# Patient Record
Sex: FEMALE | Race: WHITE | Employment: PART TIME | URBAN - METROPOLITAN AREA
[De-identification: names, ages, dates, MRNs, and addresses within clinical notes are randomized per-mention and may not be internally consistent; named-entity substitution may affect disease eponyms.]

---

## 2017-02-09 ENCOUNTER — HOSPITAL ENCOUNTER (OUTPATIENT)
Dept: LAB | Age: 67
Discharge: HOME | End: 2017-02-09
Payer: MEDICARE

## 2017-02-09 ENCOUNTER — OFFICE VISIT (OUTPATIENT)
Dept: HEMATOLOGY | Age: 67
End: 2017-02-09

## 2017-02-09 VITALS
BODY MASS INDEX: 26.49 KG/M2 | DIASTOLIC BLOOD PRESSURE: 51 MMHG | HEIGHT: 65 IN | TEMPERATURE: 99 F | WEIGHT: 159 LBS | OXYGEN SATURATION: 98 % | HEART RATE: 49 BPM | SYSTOLIC BLOOD PRESSURE: 118 MMHG | RESPIRATION RATE: 16 BRPM

## 2017-02-09 DIAGNOSIS — K74.60 CIRRHOSIS OF LIVER WITHOUT ASCITES, UNSPECIFIED HEPATIC CIRRHOSIS TYPE (HCC): ICD-10-CM

## 2017-02-09 DIAGNOSIS — K74.60 CIRRHOSIS OF LIVER WITHOUT ASCITES, UNSPECIFIED HEPATIC CIRRHOSIS TYPE (HCC): Primary | ICD-10-CM

## 2017-02-09 LAB
ALBUMIN SERPL BCP-MCNC: 3.4 G/DL (ref 3.4–5)
ALBUMIN/GLOB SERPL: 0.9 {RATIO} (ref 0.8–1.7)
ALP SERPL-CCNC: 118 U/L (ref 45–117)
ALT SERPL-CCNC: 35 U/L (ref 13–56)
ANION GAP BLD CALC-SCNC: 9 MMOL/L (ref 3–18)
AST SERPL W P-5'-P-CCNC: 32 U/L (ref 15–37)
BASOPHILS # BLD AUTO: 0 K/UL (ref 0–0.06)
BASOPHILS # BLD: 0 % (ref 0–2)
BILIRUB DIRECT SERPL-MCNC: 0.4 MG/DL (ref 0–0.2)
BILIRUB SERPL-MCNC: 1.4 MG/DL (ref 0.2–1)
BUN SERPL-MCNC: 9 MG/DL (ref 7–18)
BUN/CREAT SERPL: 11 (ref 12–20)
CALCIUM SERPL-MCNC: 9.9 MG/DL (ref 8.5–10.1)
CHLORIDE SERPL-SCNC: 105 MMOL/L (ref 100–108)
CO2 SERPL-SCNC: 30 MMOL/L (ref 21–32)
CREAT SERPL-MCNC: 0.81 MG/DL (ref 0.6–1.3)
DIFFERENTIAL METHOD BLD: ABNORMAL
EOSINOPHIL # BLD: 0.2 K/UL (ref 0–0.4)
EOSINOPHIL NFR BLD: 3 % (ref 0–5)
ERYTHROCYTE [DISTWIDTH] IN BLOOD BY AUTOMATED COUNT: 12.9 % (ref 11.6–14.5)
GLOBULIN SER CALC-MCNC: 3.6 G/DL (ref 2–4)
GLUCOSE SERPL-MCNC: 243 MG/DL (ref 74–99)
HCT VFR BLD AUTO: 40.4 % (ref 35–45)
HGB BLD-MCNC: 13.9 G/DL (ref 12–16)
INR PPP: 1.1 (ref 0.8–1.2)
LYMPHOCYTES # BLD AUTO: 40 % (ref 21–52)
LYMPHOCYTES # BLD: 1.9 K/UL (ref 0.9–3.6)
MCH RBC QN AUTO: 32.5 PG (ref 24–34)
MCHC RBC AUTO-ENTMCNC: 34.4 G/DL (ref 31–37)
MCV RBC AUTO: 94.4 FL (ref 74–97)
MONOCYTES # BLD: 0.3 K/UL (ref 0.05–1.2)
MONOCYTES NFR BLD AUTO: 7 % (ref 3–10)
NEUTS SEG # BLD: 2.4 K/UL (ref 1.8–8)
NEUTS SEG NFR BLD AUTO: 50 % (ref 40–73)
PLATELET # BLD AUTO: 87 K/UL (ref 135–420)
PMV BLD AUTO: 11.6 FL (ref 9.2–11.8)
POTASSIUM SERPL-SCNC: 4 MMOL/L (ref 3.5–5.5)
PROT SERPL-MCNC: 7 G/DL (ref 6.4–8.2)
PROTHROMBIN TIME: 14.1 SEC (ref 11.5–15.2)
RBC # BLD AUTO: 4.28 M/UL (ref 4.2–5.3)
SODIUM SERPL-SCNC: 144 MMOL/L (ref 136–145)
WBC # BLD AUTO: 4.8 K/UL (ref 4.6–13.2)

## 2017-02-09 PROCEDURE — 36415 COLL VENOUS BLD VENIPUNCTURE: CPT | Performed by: NURSE PRACTITIONER

## 2017-02-09 PROCEDURE — 82107 ALPHA-FETOPROTEIN L3: CPT | Performed by: NURSE PRACTITIONER

## 2017-02-09 PROCEDURE — 85610 PROTHROMBIN TIME: CPT | Performed by: NURSE PRACTITIONER

## 2017-02-09 PROCEDURE — 85025 COMPLETE CBC W/AUTO DIFF WBC: CPT | Performed by: NURSE PRACTITIONER

## 2017-02-09 PROCEDURE — 80076 HEPATIC FUNCTION PANEL: CPT | Performed by: NURSE PRACTITIONER

## 2017-02-09 PROCEDURE — 80048 BASIC METABOLIC PNL TOTAL CA: CPT | Performed by: NURSE PRACTITIONER

## 2017-02-09 NOTE — PROGRESS NOTES
2 [facility]    Lupis Hicks MD, DANIELE Brothers PA-C Jannelle Dunk, MD, FACP, MD Arabella Sparks NP Arelia Metz, NP        at [address], [address] [address]     [phone]     FAX: [phone]    at [facility], [address] [place] [address], [address]     [phone]     FAX: [phone]       Patient Care Team:  Radha Gonzalez DO as PCP - General (Family Practice)  Ferdinand Moreno MD (Internal Medicine)      Problem List  Date Reviewed: 2/9/2017          Codes Class Noted    Cirrhosis, cryptogenic ([facility].) ICD-10-CM: K74.69  ICD-9-CM: 571.5  7/26/2014        Diabetes mellitus ([facility].) ICD-10-CM: E11.9  ICD-9-CM: 250.00  7/21/2014        Hypothyroidism ICD-10-CM: E03.9  ICD-9-CM: 244.9  7/21/2014        S/P JAYY (total abdominal hysterectomy) ICD-10-CM: Z90.710  ICD-9-CM: V88.01  7/21/2014        S/P cataract surgery ICD-10-CM: Z98.49  ICD-9-CM: V45.61  7/21/2014        Previous back surgery ICD-10-CM: Z98.890  ICD-9-CM: V45.89  7/21/2014                Leonor Dillon returns to the [place] [address] for management of cryptogenic cirrhosis. The active problem list, all pertinent past medical history, medications, liver histology, radiologic findings and laboratory findings related to the liver disorder were reviewed with the patient. The patient has not developed ascites. Edema has resolved with low dose of diuretics. The patient has not developed hepatic encephalopathy. Imaging of the liver was performed in 09/2016 with ultrasound. Consistent with cirrhosis. No focal lesion identified. Shear wave elastography was performed with the ultrasound. This suggests cirrhosis and fatty liver. EGD in 9/2015 demonstrated normal esophagus. This will be repeated in 09/2017.      The patient underwent a liver biopsy in 2014. Dr. Andres Juarez has reviewed the biopsy slides. This demonstrates inactive cirrhosis with only minimal steatosis and no inflammation. The most recent laboratory studies indicate that the liver transaminases are normal, alkaline phosphatase is normal, tests of hepatic synthetic and metabolic function are normal, and the platelet count is depressed. The patient has no complaints which can be attributed to liver disease. The patient denies fatigue since last visit. The patient completes all daily activities without any functional limitations. The patient has not experienced fevers, chills, shortness of breath, chest pain, pain in the right side over the liver, diffuse abdominal pain, nausea, vomiting, constipation, diarrhrea, dry eyes, dry mouth, arthralgias, myalgias, yellowing of the eyes or skin, itching, dark urine, problems concentrating, swelling of the abdomen, swelling of the lower extremities, hematemesis, or hematochezia. ALLERGIES:  No Known Allergies    MEDICATIONS  Current Outpatient Prescriptions   Medication Sig    spironolactone (ALDACTONE) 50 mg tablet TAKE 1 TABLET EVERY DAY    b complex vitamins tablet Take 5,000 Tabs by mouth daily.  levothyroxine (SYNTHROID) 100 mcg tablet Take 100 mcg by mouth Daily (before breakfast). Indications: HYPOTHYROIDISM     No current facility-administered medications for this visit. REVIEW OF SYSTEMS:  Systems related to all organ systems were reviewed. All were negative except as noted above. FAMILY HISTORY:  The father  of CVA. The mother  of cirrhosis. She did not consume alcohol. There are no other family members with liver disease. SOCIAL HISTORY:  The patient is . The patient has 4 children, and 8 grandchildren. The patient has never used tobacco products. The patient has never consumed significant amounts of alcohol.     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 PHYSICAL EXAMINATION:  Visit Vitals    /51 (BP 1 Location: Left arm, BP Patient Position: Sitting)    Pulse (!) 49    Temp 99 °F (37.2 °C) (Tympanic)    Resp 16    Ht 5' 5\" (1.651 m)    Wt 159 lb (72.1 kg)    SpO2 98%    BMI 26.46 kg/m2     General: No acute distress. Eyes: Sclera anicteric. ENT: No oral lesions. Thyroid normal.  Nodes: No adenopathy. Skin: No spider angiomata. No jaundice. No palmar erythema. Respiratory: Lungs clear to auscultation. Cardiovascular: Regular heart rate. No murmurs. No JVD. Abdomen: Soft non-tender. Liver size normal to percussion/palpation. Spleen not palpable. No obvious ascites. Extremities: No edema. No muscle wasting. No gross arthritic changes. Neurologic: Alert and oriented. Cranial nerves grossly intact. No asterixsis.     LABORATORY STUDIES:    [address] Units 2/9/2017 7/26/2016 1/26/2016   WBC 4.6 - 13.2 K/uL 4.8 5.8 4.7   ANC 1.8 - 8.0 K/UL 2.4 3.3 2.7   HGB 12.0 - 16.0 g/dL 13.9 14.2 13.7    - 420 K/uL 87 (L) 107 (L) 93 (L)   INR 0.8 - 1.2   1.1 1.1 1.0   AST 15 - 37 U/L 32 39 (H) 28   ALT 13 - 56 U/L 35 40 32   Alk Phos 45 - 117 U/L 118 (H) 116 90   Bili, Total 0.2 - 1.0 MG/DL 1.4 (H) 1.6 (H) 1.1 (H)   Bili, Direct 0.0 - 0.2 MG/DL 0.4 (H) 0.4 (H) 0.3 (H)   Albumin 3.4 - 5.0 g/dL 3.4 3.5 3.4   BUN 7.0 - 18 MG/DL 9 12 8   Creat 0.6 - 1.3 MG/DL 0.81 0.84 0.79   Na 136 - 145 mmol/L 144 141 145   K 3.5 - 5.5 mmol/L 4.0 3.9 3.9   Cl 100 - 108 mmol/L 105 103 106   CO2 21 - 32 mmol/L 30 28 28   Glucose 74 - 99 mg/dL 243 (H) 150 (H) 254 (H)     Cancer Screening Latest Ref Rng & Units 2/9/2017 7/26/2016 1/26/2016   AFP, Serum 0.0 - 8.0 ng/mL 4.4 5.7 4.8   AFP-L3% 0.0 - 9.9 % Comment 6.5 8.1     SEROLOGIES:  Serologies Latest Ref Rng 7/21/2014   Hep A Ab, Total Negative Positive (A)   Hep B Surface Ag Negative Negative   Hep B Core Ab, Total Negative Negative   Ferritin 15 - 150 ng/mL 62 Iron % Saturation 15 - 55 % 29   SONIA, IFA  Negative   ASMCA 0 - 19 Units 8   M2 Ab 0.0 - 20.0 Units 11.4   Ceruloplasmin 16.0 - 45.0 mg/dL 30.3   Alpha-1 antitrypsin level 90 - 200 mg/dL 199     7/2014. Anti-HBsurface negative, anti-HCV negative. LIVER HISTOLOGY:  8/2014. Slides reviewed by MLS. Cirrhosis with minimal steatosis and no inflammation. Etiology most likely BOURNE.  09/2016. Shear wave elastography. E median is 16.77 kPa. This is highly suggestive of cirrhosis. The E Std is 4.76. This wide distrubution of data indicates fatty liver. ENDOSCOPIC PROCEDURES:  9/2014. EGD by MLS. Small esophageal varices. No banding performed. Gastitis. H.Pylori negative. 9/2015. EGD by Dr. Pamela Harper. Normal esophagus. Repeat in two years. RADIOLOGY:  6/2014. Ultrasound of liver. Echogenic consistent with cirrhosis. No liver mass lesions. No dilated bile ducts. No ascites. Recanalization of umbilical vein. 6/2014. CT scan abdomen with IV contrast.  Changes consistent with cirrhosis. No liver mass lesions. No dilated bile ducts. No ascites. 01/2015. Ultrasound of liver. Diffusely coarsened and echogenic hepatic echotexture. No focal lesion identified. 05/2015. Ultrasound of the liver. Compatible with cirrhosis. No focal hepatic lesion. 05/2015. CT of the liver. Cirrhotic appearing liver. No focal hepatic lesion. Findings in keeping with portal hypertension, no ascites. 11/2015. US of the liver. Small echogenic liver compatible with history of cirrhosis, with no new focal intrahepatic lesion. 09/2016. Ultrasound of the liver. Consistent with cirrhosis. No hepatic masses. OTHER TESTING:  Not available or performed    ASSESSMENT AND PLAN:  Cryptogenic cirrhosis. This is likely secondary to BOURNE given DM and the finding of minimal steatosis on the liver biopsy.   However, she does have positive autoimmune markers and manifests some autoimmune disorders, so this could be of resolving autoimmune etiology. The most recent laboratory studies indicate that the liver transaminases are normal, alkaline phosphatase is normal, tests of hepatic synthetic and metabolic function are normal, and the platelet count is depressed. Will perform laboratory testing to monitor liver function and degree of liver injury. This will include hepatic panel, a CBC w/ diff, a BMP, an AFP-L3%, and a PT/INR. Complications of cirrhosis were discussed in detail. We discussed thrombocytopenia, portal hypertension, varices, GI bleeding, peripheral edema, ascites, hepatic encephalopathy, and hepatocellular carcinoma. We discussed the need for follow ups on a regular basis to monitor for complications. We discussed the need for every 6 month liver imaging studies. The patient does not require liver transplant evaluation at this time and may never require this if liver function remains normal.      EGD was performed in 09/2015. This demonstrated a normal esophagus. Repeat in 09/2017. Hepatic encephalopathy has not developed to date. There is no need for treatment with lactulose and/or xifaxan at this time. The patient has developed minimal edema which is well controlled with low dose diuretics. Lower extremity edema has resolved with low dose aldactone 50 mg QD. Salt restrictions discussed. The patient was directed to continue all current medications at the current dosages. There are no contraindications for the patient to take any medications that are necessary for treatment of other medical issues. The patient was counseled regarding alcohol consumption. Thrombocytopenia secondary to cirrhosis. There is no evidence of overt bleeding. There is no indication for platelet transfusions or pharmacologic treatment to increase the platelet count. Vaccination for viral hepatitis A is not needed.   The patient has serologic evidence of prior exposure or vaccination with immunity. [address] screening has recently been performed and does not suggest [address] Repeat imaging in 03/2017. This was ordered today. [facility] 87 in 6 months.     Trista Willoughby NP   [facility] [place] of [address] [place] [address], [address] [place] [address]  [phone]

## 2017-02-09 NOTE — MR AVS SNAPSHOT
Visit Information Date & Time Provider Department Dept. Phone Encounter #  
 2/9/2017  2:30 PM Peter Farley NP [facility] [place] [facility] [id number] Follow-up Instructions Return in about 6 months (around 8/9/2017). Your Appointments 8/10/2017  3:00 PM  
Follow Up with Peter Farley NP [facility] [place] [facility] [place] ([facility]) Appt Note: Cryptogenic Cirrhosis [address]  
  
   
 [address] [place] [address] Upcoming Health Maintenance Date Due HEMOGLOBIN A1C Q6M 1950 LIPID PANEL Q1 1950 FOOT EXAM Q1 12/4/1958 MICROALBUMIN Q1 12/4/1958 EYE EXAM RETINAL OR DILATED Q1 12/4/1958 DTaP/Tdap/Td series (1 - Tdap) 12/4/1969 BREAST CANCER SCRN MAMMOGRAM 12/4/1998 FOBT Q 1 YEAR AGE 50-75 12/4/1998 ZOSTER VACCINE AGE 60> 12/4/2008 GLAUCOMA SCREENING Q2Y 12/4/2013 OSTEOPOROSIS SCREENING (DEXA) 12/4/2013 Pneumococcal 65+ Low/Medium Risk (1 of 2 - PCV13) 12/4/2013 MEDICARE YEARLY EXAM 12/4/2013 INFLUENZA AGE 9 TO ADULT 8/1/2016 Allergies as of 2/9/2017  Review Complete On: 2/9/2017 By: Josue Luke No Known Allergies Current Immunizations  Never Reviewed No immunizations on file. Not reviewed this visit You Were Diagnosed With   
  
 Codes Comments Cirrhosis of liver without ascites, unspecified hepatic cirrhosis type ([facility]    -  Primary ICD-10-CM: K74.60 ICD-9-CM: 571.5 Vitals BP Pulse Temp Resp Height(growth percentile) Weight(growth percentile) 118/51 (BP 1 Location: Left arm, BP Patient Position: Sitting) (!) 49 99 °F (37.2 °C) (Tympanic) 16 5' 5\" (1.651 m) 159 lb (72.1 kg) SpO2 BMI OB Status Smoking Status 98% 26.46 kg/m2 Hysterectomy Never Smoker Vitals History BMI and BSA Data Body Mass Index Body Surface Area 26.46 kg/m 2 1.82 m 2 Preferred Pharmacy Pharmacy Name Phone [facility] [address] [place] [address] [address] [facility] [address] [place] [address] [phone] Your Updated Medication List  
  
   
This list is accurate as of: 2/9/17  3:13 PM.  Always use your most recent med list.  
  
  
  
  
 b complex vitamins tablet Take 5,000 Tabs by mouth daily. levothyroxine 100 mcg tablet Commonly known as:  SYNTHROID Take 100 mcg by mouth Daily (before breakfast). Indications: HYPOTHYROIDISM  
  
 spironolactone 50 mg tablet Commonly known as:  ALDACTONE  
TAKE 1 TABLET EVERY DAY Follow-up Instructions Return in about 6 months (around 8/9/2017). To-Do List   
 02/09/2017 Lab:  AFP WITH AFP-L3%   
  
 02/09/2017 Lab:  CBC WITH AUTOMATED DIFF   
  
 02/09/2017 Lab:  HEPATIC FUNCTION PANEL   
  
 02/09/2017 Lab:  METABOLIC PANEL, BASIC   
  
 02/09/2017 Lab:  PROTHROMBIN TIME + INR   
  
 03/01/2017 Imaging:  [facility] Introducing [facility]! [employer] introduces [employer] patient portal. Now you can access parts of your medical record, email your doctor's office, and request medication refills online. 1. In your internet browser, go to [email or web address] 2. Click on the First Time User? Click Here link in the Sign In box. You will see the New Member Sign Up page. 3. Enter your [employer] Access Code exactly as it appears below. You will not need to use this code after youve completed the sign-up process. If you do not sign up before the expiration date, you must request a new code. · [employer] Access Code: [email or web address] Expires: 5/10/2017  3:13 PM 
 
4. Enter the last four digits of your Social Security Number (xxxx) and Date of Birth (mm/dd/yyyy) as indicated and click Submit. You will be taken to the next sign-up page. 5. Create a [employer] ID.  This will be your [employer] login ID and cannot be changed, so think of one that is secure and easy to remember. 6. Create a [employer] password. You can change your password at any time. 7. Enter your Password Reset Question and Answer. This can be used at a later time if you forget your password. 8. Enter your e-mail address. You will receive e-mail notification when new information is available in [address] 9. Click Sign Up. You can now view and download portions of your medical record. 10. Click the Download Summary menu link to download a portable copy of your medical information. If you have questions, please visit the Frequently Asked Questions section of the [employer] website. Remember, [employer] is NOT to be used for urgent needs. For medical emergencies, dial 911. Now available from your iPhone and Android! Please provide this summary of care documentation to your next provider. Your primary care clinician is listed as Sophia Ye. If you have any questions after today's visit, please call [phone].

## 2017-02-10 LAB
AFP L3 MFR SERPL: NORMAL % (ref 0–9.9)
AFP SERPL-MCNC: 4.4 NG/ML (ref 0–8)

## 2017-02-14 ENCOUNTER — TELEPHONE (OUTPATIENT)
Dept: HEMATOLOGY | Age: 67
End: 2017-02-14

## 2017-05-17 ENCOUNTER — HOSPITAL ENCOUNTER (OUTPATIENT)
Dept: ULTRASOUND IMAGING | Age: 67
Discharge: HOME | End: 2017-05-17
Payer: MEDICARE

## 2017-05-17 DIAGNOSIS — K74.60 CIRRHOSIS OF LIVER WITHOUT ASCITES, UNSPECIFIED HEPATIC CIRRHOSIS TYPE (HCC): ICD-10-CM

## 2017-05-17 PROCEDURE — 76705 ECHO EXAM OF ABDOMEN: CPT

## 2017-06-12 NOTE — PROGRESS NOTES
Please let her know that her ultrasound looks good. No hepatic masses. Compatible with cirrhosis. Follow up as scheduled.

## 2017-10-11 ENCOUNTER — OFFICE VISIT (OUTPATIENT)
Dept: HEMATOLOGY | Age: 67
End: 2017-10-11

## 2017-10-11 ENCOUNTER — HOSPITAL ENCOUNTER (OUTPATIENT)
Dept: LAB | Age: 67
Discharge: HOME | End: 2017-10-11
Payer: MEDICARE

## 2017-10-11 VITALS
OXYGEN SATURATION: 97 % | WEIGHT: 160 LBS | BODY MASS INDEX: 26.66 KG/M2 | RESPIRATION RATE: 18 BRPM | HEIGHT: 65 IN | TEMPERATURE: 98 F | HEART RATE: 49 BPM | SYSTOLIC BLOOD PRESSURE: 139 MMHG | DIASTOLIC BLOOD PRESSURE: 52 MMHG

## 2017-10-11 DIAGNOSIS — K74.60 CIRRHOSIS OF LIVER WITHOUT ASCITES, UNSPECIFIED HEPATIC CIRRHOSIS TYPE (HCC): ICD-10-CM

## 2017-10-11 DIAGNOSIS — K74.60 CIRRHOSIS OF LIVER WITHOUT ASCITES, UNSPECIFIED HEPATIC CIRRHOSIS TYPE (HCC): Primary | ICD-10-CM

## 2017-10-11 LAB
ALBUMIN SERPL-MCNC: 3.2 G/DL (ref 3.4–5)
ALBUMIN/GLOB SERPL: 0.8 {RATIO} (ref 0.8–1.7)
ALP SERPL-CCNC: 143 U/L (ref 45–117)
ALT SERPL-CCNC: 38 U/L (ref 13–56)
ANION GAP SERPL CALC-SCNC: 9 MMOL/L (ref 3–18)
AST SERPL-CCNC: 37 U/L (ref 15–37)
BASOPHILS # BLD: 0 K/UL (ref 0–0.06)
BASOPHILS NFR BLD: 1 % (ref 0–2)
BILIRUB DIRECT SERPL-MCNC: 0.4 MG/DL (ref 0–0.2)
BILIRUB SERPL-MCNC: 1.5 MG/DL (ref 0.2–1)
BUN SERPL-MCNC: 12 MG/DL (ref 7–18)
BUN/CREAT SERPL: 14 (ref 12–20)
CALCIUM SERPL-MCNC: 9.8 MG/DL (ref 8.5–10.1)
CHLORIDE SERPL-SCNC: 101 MMOL/L (ref 100–108)
CO2 SERPL-SCNC: 29 MMOL/L (ref 21–32)
CREAT SERPL-MCNC: 0.83 MG/DL (ref 0.6–1.3)
DIFFERENTIAL METHOD BLD: ABNORMAL
EOSINOPHIL # BLD: 0.2 K/UL (ref 0–0.4)
EOSINOPHIL NFR BLD: 5 % (ref 0–5)
ERYTHROCYTE [DISTWIDTH] IN BLOOD BY AUTOMATED COUNT: 13 % (ref 11.6–14.5)
GLOBULIN SER CALC-MCNC: 4.1 G/DL (ref 2–4)
GLUCOSE SERPL-MCNC: 256 MG/DL (ref 74–99)
HCT VFR BLD AUTO: 38.1 % (ref 35–45)
HGB BLD-MCNC: 13.2 G/DL (ref 12–16)
INR PPP: 1.1 (ref 0.8–1.2)
LYMPHOCYTES # BLD: 1.4 K/UL (ref 0.9–3.6)
LYMPHOCYTES NFR BLD: 34 % (ref 21–52)
MCH RBC QN AUTO: 32.7 PG (ref 24–34)
MCHC RBC AUTO-ENTMCNC: 34.6 G/DL (ref 31–37)
MCV RBC AUTO: 94.3 FL (ref 74–97)
MONOCYTES # BLD: 0.3 K/UL (ref 0.05–1.2)
MONOCYTES NFR BLD: 8 % (ref 3–10)
NEUTS SEG # BLD: 2.1 K/UL (ref 1.8–8)
NEUTS SEG NFR BLD: 52 % (ref 40–73)
PLATELET # BLD AUTO: 91 K/UL (ref 135–420)
PMV BLD AUTO: 11.2 FL (ref 9.2–11.8)
POTASSIUM SERPL-SCNC: 4 MMOL/L (ref 3.5–5.5)
PROT SERPL-MCNC: 7.3 G/DL (ref 6.4–8.2)
PROTHROMBIN TIME: 13.5 SEC (ref 11.5–15.2)
RBC # BLD AUTO: 4.04 M/UL (ref 4.2–5.3)
SODIUM SERPL-SCNC: 139 MMOL/L (ref 136–145)
WBC # BLD AUTO: 4 K/UL (ref 4.6–13.2)

## 2017-10-11 PROCEDURE — 80048 BASIC METABOLIC PNL TOTAL CA: CPT | Performed by: NURSE PRACTITIONER

## 2017-10-11 PROCEDURE — 85610 PROTHROMBIN TIME: CPT | Performed by: NURSE PRACTITIONER

## 2017-10-11 PROCEDURE — 36415 COLL VENOUS BLD VENIPUNCTURE: CPT | Performed by: NURSE PRACTITIONER

## 2017-10-11 PROCEDURE — 85025 COMPLETE CBC W/AUTO DIFF WBC: CPT | Performed by: NURSE PRACTITIONER

## 2017-10-11 PROCEDURE — 82107 ALPHA-FETOPROTEIN L3: CPT | Performed by: NURSE PRACTITIONER

## 2017-10-11 PROCEDURE — 80076 HEPATIC FUNCTION PANEL: CPT | Performed by: NURSE PRACTITIONER

## 2017-10-11 RX ORDER — SPIRONOLACTONE 50 MG/1
50 TABLET, FILM COATED ORAL DAILY
Qty: 90 TAB | Refills: 3 | Status: SHIPPED | OUTPATIENT
Start: 2017-10-11 | End: 2018-01-23 | Stop reason: SDUPTHER

## 2017-10-11 RX ORDER — FUROSEMIDE 20 MG/1
TABLET ORAL
Qty: 90 TAB | Refills: 3 | Status: SHIPPED | OUTPATIENT
Start: 2017-10-11 | End: 2018-01-23 | Stop reason: SDUPTHER

## 2017-10-11 NOTE — PROGRESS NOTES
Sylwia Schaffer is a 74 y.o. female    No chief complaint on file. 1. Have you been to the ER, urgent care clinic or hospitalized since your last visit? NO.     2. Have you seen or consulted any other health care providers outside of the [address] [place] [address] since your last visit (Include any pap smears or colon screening)?  NO

## 2017-10-11 NOTE — PROGRESS NOTES
[address] MD Jesus, Carlos Myles, Cite Vaishali Robert, [place]       Vida Frederick, NP Hazle Seip, PA-C Lori Parrot, [facility]   DANIELE Burks NP        at [address]     [address] [place] [address], [address]     [phone]     FAX: [phone]    at [facility], [address] [place] [address], [address]     [phone]     FAX: [phone]       Patient Care Team:  Goyo Mcneal DO as PCP - General (Family Practice)  Han Hogan MD (Internal Medicine)      Problem List  Date Reviewed: 10/11/2017          Codes Class Noted    Cirrhosis, cryptogenic ([facility] ICD-10-CM: K74.69  ICD-9-CM: 571.5  7/26/2014        Diabetes mellitus ([facility] ICD-10-CM: E11.9  ICD-9-CM: 250.00  7/21/2014        Hypothyroidism ICD-10-CM: E03.9  ICD-9-CM: 244.9  7/21/2014        S/P JAYY (total abdominal hysterectomy) ICD-10-CM: Z90.710  ICD-9-CM: V88.01  7/21/2014        S/P cataract surgery ICD-10-CM: Z98.49  ICD-9-CM: V45.61  7/21/2014        Previous back surgery ICD-10-CM: Z98.890  ICD-9-CM: V45.89  7/21/2014                Zach Mcbride returns to the [address] for management of cryptogenic cirrhosis. The active problem list, all pertinent past medical history, medications, liver histology, radiologic findings and laboratory findings related to the liver disorder were reviewed with the patient. The patient has not developed ascites. She may have some fluid build up now. Edema had resolved with low dose of diuretics. She has some lower extremity edema now. The patient has not developed hepatic encephalopathy. Imaging of the liver was performed in 09/2016 with ultrasound. Consistent with cirrhosis. No focal lesion identified. Shear wave elastography was performed with the ultrasound. This suggests cirrhosis and fatty liver.       EGD in 9/2015 demonstrated normal esophagus. This will be repeated in 2017. The patient underwent a liver biopsy in 2014. Dr. Eduardo aMrino has reviewed the biopsy slides. This demonstrates inactive cirrhosis with only minimal steatosis and no inflammation. The most recent laboratory studies indicate that the liver transaminases are normal, alkaline phosphatase is elevated, tests of hepatic synthetic and metabolic function are normal, and the platelet count is depressed. The patient has no complaints which can be attributed to liver disease. The patient denies fatigue since last visit. The patient completes all daily activities without any functional limitations. The patient has not experienced fevers, chills, shortness of breath, chest pain, pain in the right side over the liver, diffuse abdominal pain, nausea, vomiting, constipation, diarrhrea, dry eyes, dry mouth, arthralgias, myalgias, yellowing of the eyes or skin, itching, dark urine, problems concentrating, swelling of the abdomen, swelling of the lower extremities, hematemesis, or hematochezia. ALLERGIES:  No Known Allergies    MEDICATIONS  Current Outpatient Prescriptions   Medication Sig    insulin NPH (NOVOLIN N, HUMULIN N) 100 unit/mL injection 35 Units by SubCUTAneous route two (2) times a day.  b complex vitamins tablet Take 5,000 Tabs by mouth daily.  levothyroxine (SYNTHROID) 100 mcg tablet Take 100 mcg by mouth Daily (before breakfast). Indications: HYPOTHYROIDISM     No current facility-administered medications for this visit. REVIEW OF SYSTEMS:  Systems related to all organ systems were reviewed. All were negative except as noted above. FAMILY HISTORY:  The father  of CVA. The mother  of cirrhosis. She did not consume alcohol. There are no other family members with liver disease. SOCIAL HISTORY:  The patient is . The patient has 4 children, and 8 grandchildren.     The patient has never used tobacco products. The patient has never consumed significant amounts of alcohol. The patient currently works part time for a . PHYSICAL EXAMINATION:  Visit Vitals    /52 (BP 1 Location: Right arm, BP Patient Position: Sitting)    Pulse (!) 49    Temp 98 °F (36.7 °C) (Tympanic)    Resp 18    Ht 5' 4.5\" (1.638 m)    Wt 160 lb (72.6 kg)    SpO2 97%    BMI 27.04 kg/m2     General: No acute distress. Eyes: Sclera anicteric. ENT: No oral lesions. Thyroid normal.  Nodes: No adenopathy. Skin: No spider angiomata. No jaundice. No palmar erythema. Respiratory: Lungs clear to auscultation. Cardiovascular: Regular heart rate. No murmurs. No JVD. Abdomen: Soft non-tender. Liver size normal to percussion/palpation. Spleen not palpable. No obvious ascites. Extremities: No edema. No muscle wasting. No gross arthritic changes. Neurologic: Alert and oriented. Cranial nerves grossly intact. No asterixsis.     LABORATORY STUDIES:    Liver [facility] of [address] [place] [address] & Units 10/11/2017 2/9/2017 7/26/2016   WBC 4.6 - 13.2 K/uL 4.0 (L) 4.8 5.8   ANC 1.8 - 8.0 K/UL 2.1 2.4 3.3   HGB 12.0 - 16.0 g/dL 13.2 13.9 14.2    - 420 K/uL 91 (L) 87 (L) 107 (L)   INR 0.8 - 1.2   1.1 1.1 1.1   AST 15 - 37 U/L 37 32 39 (H)   ALT 13 - 56 U/L 38 35 40   Alk Phos 45 - 117 U/L 143 (H) 118 (H) 116   Bili, Total 0.2 - 1.0 MG/DL 1.5 (H) 1.4 (H) 1.6 (H)   Bili, Direct 0.0 - 0.2 MG/DL 0.4 (H) 0.4 (H) 0.4 (H)   Albumin 3.4 - 5.0 g/dL 3.2 (L) 3.4 3.5   BUN 7.0 - 18 MG/DL 12 9 12   Creat 0.6 - 1.3 MG/DL 0.83 0.81 0.84   Na 136 - 145 mmol/L 139 144 141   K 3.5 - 5.5 mmol/L 4.0 4.0 3.9   Cl 100 - 108 mmol/L 101 105 103   CO2 21 - 32 mmol/L 29 30 28   Glucose 74 - 99 mg/dL 256 (H) 243 (H) 150 (H)       Cancer Screening Latest Ref Rng & Units 2/9/2017 7/26/2016 1/26/2016   AFP, Serum 0.0 - 8.0 ng/mL 4.4 5.7 4.8   AFP-L3% 0.0 - 9.9 % Comment 6.5 8.1     SEROLOGIES:  Serologies Latest Ref Rng 7/21/2014   Hep A Ab, Total Negative Positive (A)   Hep B Surface Ag Negative Negative   Hep B Core Ab, Total Negative Negative   Ferritin 15 - 150 ng/mL 62   Iron % Saturation 15 - 55 % 29   SONIA, IFA  Negative   ASMCA 0 - 19 Units 8   M2 Ab 0.0 - 20.0 Units 11.4   Ceruloplasmin 16.0 - 45.0 mg/dL 30.3   Alpha-1 antitrypsin level 90 - 200 mg/dL 199     7/2014. Anti-HBsurface negative, anti-HCV negative. LIVER HISTOLOGY:  8/2014. Slides reviewed by MLS. Cirrhosis with minimal steatosis and no inflammation. Etiology most likely BOURNE.  09/2016. Shear wave elastography. E median is 16.77 kPa. This is highly suggestive of cirrhosis. The E Std is 4.76. This wide distrubution of data indicates fatty liver. ENDOSCOPIC PROCEDURES:  9/2014. EGD by MLS. Small esophageal varices. No banding performed. Gastitis. H.Pylori negative. 9/2015. EGD by Dr. Ruddy Brady. Normal esophagus. Repeat in two years. RADIOLOGY:  6/2014. Ultrasound of liver. Echogenic consistent with cirrhosis. No liver mass lesions. No dilated bile ducts. No ascites. Recanalization of umbilical vein. 6/2014. CT scan abdomen with IV contrast.  Changes consistent with cirrhosis. No liver mass lesions. No dilated bile ducts. No ascites. 01/2015. Ultrasound of liver. Diffusely coarsened and echogenic hepatic echotexture. No focal lesion identified. 05/2015. Ultrasound of the liver. Compatible with cirrhosis. No focal hepatic lesion. 05/2015. CT of the liver. Cirrhotic appearing liver. No focal hepatic lesion. Findings in keeping with portal hypertension, no ascites. 11/2015. US of the liver. Small echogenic liver compatible with history of cirrhosis, with no new focal intrahepatic lesion. 09/2016. Ultrasound of the liver. Consistent with cirrhosis. No hepatic masses. 05/2017. Ultrasound of the liver. Nodular hepatic contour with underlying parenchymal stigmata of cirrhosis.  No focal hepatic lesion. OTHER TESTING:  Not available or performed    ASSESSMENT AND PLAN:  Cryptogenic cirrhosis. This is likely secondary to BOURNE given DM and the finding of minimal steatosis on the liver biopsy. However, she does have positive autoimmune markers and manifests some autoimmune disorders, so this could be of resolving autoimmune etiology. The most recent laboratory studies indicate that the liver transaminases are normal, alkaline phosphatase is elevated, tests of hepatic synthetic and metabolic function are normal, and the platelet count is depressed. Complications of cirrhosis were discussed in detail. We discussed thrombocytopenia, portal hypertension, varices, GI bleeding, peripheral edema, ascites, hepatic encephalopathy, and hepatocellular carcinoma. We discussed the need for follow ups on a regular basis to monitor for complications. We discussed the need for every 6 month liver imaging studies. The patient does not require liver transplant evaluation at this time and may never require this if liver function remains normal.      EGD was performed in 09/2015. This demonstrated a normal esophagus. Repeat in 09/2017. Hepatic encephalopathy has not developed to date. There is no need for treatment with lactulose and/or xifaxan at this time. The patient has developed minimal edema which was well controlled with low dose diuretics until about a month ago. I increased her diuretics to step one today. She had been on 50 mg aldactone alone. Salt restrictions discussed. The patient was directed to continue all current medications at the current dosages. There are no contraindications for the patient to take any medications that are necessary for treatment of other medical issues. The patient was counseled regarding alcohol consumption. Thrombocytopenia secondary to cirrhosis. There is no evidence of overt bleeding.   There is no indication for platelet transfusions or pharmacologic treatment to increase the platelet count. Vaccination for viral hepatitis A is not needed. The patient has serologic evidence of prior exposure or vaccination with immunity. [facility] screening has recently been performed and does not suggest [facility] Repeat imaging in 11/2017. This was ordered today. 25 minutes total time spent with this patient with more than 50% of this time spent counseling and coordinating care as described above. [facility] 87 in 3 months.     Erika Olivares NP   Liver [facility] of [address] [place] [address] [place]ST, [address] [place] [address] Karen Nick, [address] [place] [address]  [phone]

## 2017-10-12 LAB
AFP L3 MFR SERPL: NORMAL % (ref 0–9.9)
AFP SERPL-MCNC: 3.4 NG/ML (ref 0–8)

## 2017-10-23 ENCOUNTER — HOSPITAL ENCOUNTER (OUTPATIENT)
Dept: ULTRASOUND IMAGING | Age: 67
Discharge: HOME | End: 2017-10-23
Payer: MEDICARE

## 2017-10-23 DIAGNOSIS — K74.60 CIRRHOSIS OF LIVER WITHOUT ASCITES, UNSPECIFIED HEPATIC CIRRHOSIS TYPE (HCC): ICD-10-CM

## 2017-10-23 PROCEDURE — 76705 ECHO EXAM OF ABDOMEN: CPT

## 2017-11-21 ENCOUNTER — HOSPITAL ENCOUNTER (OUTPATIENT)
Dept: LAB | Age: 67
Discharge: HOME | End: 2017-11-21
Payer: MEDICARE

## 2017-11-21 ENCOUNTER — OFFICE VISIT (OUTPATIENT)
Dept: HEMATOLOGY | Age: 67
End: 2017-11-21

## 2017-11-21 VITALS
OXYGEN SATURATION: 97 % | WEIGHT: 163.6 LBS | HEART RATE: 54 BPM | HEIGHT: 64 IN | TEMPERATURE: 97.7 F | RESPIRATION RATE: 16 BRPM | BODY MASS INDEX: 27.93 KG/M2 | DIASTOLIC BLOOD PRESSURE: 56 MMHG | SYSTOLIC BLOOD PRESSURE: 140 MMHG

## 2017-11-21 DIAGNOSIS — K74.60 CIRRHOSIS OF LIVER WITHOUT ASCITES, UNSPECIFIED HEPATIC CIRRHOSIS TYPE (HCC): Primary | ICD-10-CM

## 2017-11-21 DIAGNOSIS — K74.60 CIRRHOSIS OF LIVER WITHOUT ASCITES, UNSPECIFIED HEPATIC CIRRHOSIS TYPE (HCC): ICD-10-CM

## 2017-11-21 LAB
ALBUMIN SERPL-MCNC: 3.4 G/DL (ref 3.4–5)
ALBUMIN/GLOB SERPL: 0.8 {RATIO} (ref 0.8–1.7)
ALP SERPL-CCNC: 181 U/L (ref 45–117)
ALT SERPL-CCNC: 47 U/L (ref 13–56)
ANION GAP SERPL CALC-SCNC: 12 MMOL/L (ref 3–18)
AST SERPL-CCNC: 53 U/L (ref 15–37)
BASOPHILS # BLD: 0 K/UL (ref 0–0.06)
BASOPHILS NFR BLD: 1 % (ref 0–2)
BILIRUB DIRECT SERPL-MCNC: 0.4 MG/DL (ref 0–0.2)
BILIRUB SERPL-MCNC: 1.4 MG/DL (ref 0.2–1)
BUN SERPL-MCNC: 12 MG/DL (ref 7–18)
BUN/CREAT SERPL: 15 (ref 12–20)
CALCIUM SERPL-MCNC: 10.4 MG/DL (ref 8.5–10.1)
CHLORIDE SERPL-SCNC: 102 MMOL/L (ref 100–108)
CO2 SERPL-SCNC: 31 MMOL/L (ref 21–32)
CREAT SERPL-MCNC: 0.82 MG/DL (ref 0.6–1.3)
DIFFERENTIAL METHOD BLD: ABNORMAL
EOSINOPHIL # BLD: 0.2 K/UL (ref 0–0.4)
EOSINOPHIL NFR BLD: 4 % (ref 0–5)
ERYTHROCYTE [DISTWIDTH] IN BLOOD BY AUTOMATED COUNT: 13.2 % (ref 11.6–14.5)
GLOBULIN SER CALC-MCNC: 4.4 G/DL (ref 2–4)
GLUCOSE SERPL-MCNC: 167 MG/DL (ref 74–99)
HCT VFR BLD AUTO: 42.8 % (ref 35–45)
HGB BLD-MCNC: 14.3 G/DL (ref 12–16)
INR PPP: 1.1 (ref 0.8–1.2)
LYMPHOCYTES # BLD: 1.3 K/UL (ref 0.9–3.6)
LYMPHOCYTES NFR BLD: 30 % (ref 21–52)
MCH RBC QN AUTO: 32.1 PG (ref 24–34)
MCHC RBC AUTO-ENTMCNC: 33.4 G/DL (ref 31–37)
MCV RBC AUTO: 96 FL (ref 74–97)
MONOCYTES # BLD: 0.4 K/UL (ref 0.05–1.2)
MONOCYTES NFR BLD: 8 % (ref 3–10)
NEUTS SEG # BLD: 2.5 K/UL (ref 1.8–8)
NEUTS SEG NFR BLD: 57 % (ref 40–73)
PLATELET # BLD AUTO: 106 K/UL (ref 135–420)
PMV BLD AUTO: 12 FL (ref 9.2–11.8)
POTASSIUM SERPL-SCNC: 4 MMOL/L (ref 3.5–5.5)
PROT SERPL-MCNC: 7.8 G/DL (ref 6.4–8.2)
PROTHROMBIN TIME: 13.6 SEC (ref 11.5–15.2)
RBC # BLD AUTO: 4.46 M/UL (ref 4.2–5.3)
SODIUM SERPL-SCNC: 145 MMOL/L (ref 136–145)
WBC # BLD AUTO: 4.4 K/UL (ref 4.6–13.2)

## 2017-11-21 PROCEDURE — 36415 COLL VENOUS BLD VENIPUNCTURE: CPT | Performed by: NURSE PRACTITIONER

## 2017-11-21 PROCEDURE — 80076 HEPATIC FUNCTION PANEL: CPT | Performed by: NURSE PRACTITIONER

## 2017-11-21 PROCEDURE — 80048 BASIC METABOLIC PNL TOTAL CA: CPT | Performed by: NURSE PRACTITIONER

## 2017-11-21 PROCEDURE — 85025 COMPLETE CBC W/AUTO DIFF WBC: CPT | Performed by: NURSE PRACTITIONER

## 2017-11-21 PROCEDURE — 85610 PROTHROMBIN TIME: CPT | Performed by: NURSE PRACTITIONER

## 2017-11-21 PROCEDURE — 82107 ALPHA-FETOPROTEIN L3: CPT | Performed by: NURSE PRACTITIONER

## 2017-11-21 NOTE — PROGRESS NOTES
[address] MD, [place], Cite Vaishalimyrna Jones, [place]       Kael De Luna, VIVI Moncada, ALEJANDRO-BC   DANIELE Barreto Res, NP        at [address], [address] [address]     [phone]     FAX: [phone]    at [address], [address] [place] [address], [address]     [phone]     FAX: [phone]       Patient Care Team:  Tammy Hanson DO as PCP - General (Family Practice)  Concha Olsen MD (Internal Medicine)  Chelsy Myles MD (Endocrinology)  Vikash Waters MD (Gastroenterology)      Problem List  Date Reviewed: 11/21/2017          Codes Class Noted    Cirrhosis, cryptogenic [facility]) ICD-10-CM: K74.69  ICD-9-CM: 571.5  7/26/2014        Diabetes mellitus ([facility] ICD-10-CM: E11.9  ICD-9-CM: 250.00  7/21/2014        Hypothyroidism ICD-10-CM: E03.9  ICD-9-CM: 244.9  7/21/2014        S/P JAYY (total abdominal hysterectomy) ICD-10-CM: Z90.710  ICD-9-CM: V88.01  7/21/2014        S/P cataract surgery ICD-10-CM: Z98.49  ICD-9-CM: V45.61  7/21/2014        Previous back surgery ICD-10-CM: Z98.890  ICD-9-CM: V45.89  7/21/2014                López Ivan returns to the [address] for management of cryptogenic cirrhosis. The active problem list, all pertinent past medical history, medications, liver histology, radiologic findings and laboratory findings related to the liver disorder were reviewed with the patient. The patient has not developed ascites. She may have some fluid build up now. Edema had resolved with low dose of diuretics. She has some lower extremity edema now. The patient has not developed hepatic encephalopathy. Imaging of the liver was performed in 09/2016 with ultrasound. Consistent with cirrhosis. No focal lesion identified.      Shear wave elastography was performed with the ultrasound. This suggests cirrhosis and fatty liver. EGD in 9/2015 demonstrated normal esophagus. The EGD was repeated earlier this month by Dr. Khushi Lucas. This demonstrated simple esophageal varices and portal hypertensive gastropathy. The patient underwent a liver biopsy in 09/2014. Dr. Zohreh Rodgers has reviewed the biopsy slides. This demonstrates inactive cirrhosis with only minimal steatosis and no inflammation. The most recent laboratory studies indicate that the liver transaminases are normal, alkaline phosphatase is elevated, tests of hepatic synthetic and metabolic function are normal, and the platelet count is depressed. The patient has no complaints which can be attributed to liver disease. The patient denies fatigue since last visit. The patient completes all daily activities without any functional limitations. The patient has not experienced fevers, chills, shortness of breath, chest pain, pain in the right side over the liver, diffuse abdominal pain, nausea, vomiting, constipation, diarrhrea, dry eyes, dry mouth, arthralgias, myalgias, yellowing of the eyes or skin, itching, dark urine, problems concentrating, swelling of the abdomen, swelling of the lower extremities, hematemesis, or hematochezia. ALLERGIES:  No Known Allergies    MEDICATIONS  Current Outpatient Prescriptions   Medication Sig    insulin NPH (NOVOLIN N, HUMULIN N) 100 unit/mL injection 35 Units by SubCUTAneous route two (2) times a day.  spironolactone (ALDACTONE) 50 mg tablet Take 1 Tab by mouth daily.  furosemide (LASIX) 20 mg tablet take together with the aldactone.  levothyroxine (SYNTHROID) 100 mcg tablet Take 100 mcg by mouth Daily (before breakfast). Indications: HYPOTHYROIDISM    b complex vitamins tablet Take 5,000 Tabs by mouth daily. No current facility-administered medications for this visit. REVIEW OF SYSTEMS:  Systems related to all organ systems were reviewed.   All were negative except as noted above. FAMILY HISTORY:  The father  of CVA. The mother  of cirrhosis. She did not consume alcohol. There are no other family members with liver disease. SOCIAL HISTORY:  The patient is . The patient has 4 children, and 8 grandchildren. The patient has never used tobacco products. The patient has never consumed significant amounts of alcohol. The patient currently works part time for a . PHYSICAL EXAMINATION:  Visit Vitals    /56 (BP 1 Location: Left arm, BP Patient Position: Sitting)    Pulse (!) 54    Temp 97.7 °F (36.5 °C) (Tympanic)    Resp 16    Ht 5' 4\" (1.626 m)    Wt 163 lb 9.6 oz (74.2 kg)    SpO2 97%    BMI 28.08 kg/m2     General: No acute distress. Eyes: Sclera anicteric. ENT: No oral lesions. Thyroid normal.  Nodes: No adenopathy. Skin: No spider angiomata. No jaundice. No palmar erythema. Respiratory: Lungs clear to auscultation. Cardiovascular: Regular heart rate. No murmurs. No JVD. Abdomen: Soft non-tender. Liver size normal to percussion/palpation. Spleen not palpable. No obvious ascites. Extremities: No edema. No muscle wasting. No gross arthritic changes. Neurologic: Alert and oriented. Cranial nerves grossly intact. No asterixsis.     LABORATORY STUDIES:    Liver [facility] [address] & Units 10/11/2017 2017 2016   WBC 4.6 - 13.2 K/uL 4.0 (L) 4.8 5.8   ANC 1.8 - 8.0 K/UL 2.1 2.4 3.3   HGB 12.0 - 16.0 g/dL 13.2 13.9 14.2    - 420 K/uL 91 (L) 87 (L) 107 (L)   INR 0.8 - 1.2   1.1 1.1 1.1   AST 15 - 37 U/L 37 32 39 (H)   ALT 13 - 56 U/L 38 35 40   Alk Phos 45 - 117 U/L 143 (H) 118 (H) 116   Bili, Total 0.2 - 1.0 MG/DL 1.5 (H) 1.4 (H) 1.6 (H)   Bili, Direct 0.0 - 0.2 MG/DL 0.4 (H) 0.4 (H) 0.4 (H)   Albumin 3.4 - 5.0 g/dL 3.2 (L) 3.4 3.5   BUN 7.0 - 18 MG/DL 12 9 12   Creat 0.6 - 1.3 MG/DL 0.83 0.81 0.84   Na 136 - 145 mmol/L 139 144 141   K 3.5 - 5.5 mmol/L 4.0 4.0 3.9   Cl 100 - 108 mmol/L 101 105 103   CO2 21 - 32 mmol/L 29 30 28   Glucose 74 - 99 mg/dL 256 (H) 243 (H) 150 (H)     Cancer Screening Latest Ref Rng & Units 10/11/2017 2/9/2017 7/26/2016   AFP, Serum 0.0 - 8.0 ng/mL 3.4 4.4 5.7   AFP-L3% 0.0 - 9.9 % Comment Comment 6.5     SEROLOGIES:  Serologies Latest Ref Rng 7/21/2014   Hep A Ab, Total Negative Positive (A)   Hep B Surface Ag Negative Negative   Hep B Core Ab, Total Negative Negative   Ferritin 15 - 150 ng/mL 62   Iron % Saturation 15 - 55 % 29   SONIA, IFA  Negative   ASMCA 0 - 19 Units 8   M2 Ab 0.0 - 20.0 Units 11.4   Ceruloplasmin 16.0 - 45.0 mg/dL 30.3   Alpha-1 antitrypsin level 90 - 200 mg/dL 199     7/2014. Anti-HBsurface negative, anti-HCV negative. LIVER HISTOLOGY:  8/2014. Slides reviewed by MLS. Cirrhosis with minimal steatosis and no inflammation. Etiology most likely BOURNE.  09/2016. Shear wave elastography. E median is 16.77 kPa. This is highly suggestive of cirrhosis. The E Std is 4.76. This wide distrubution of data indicates fatty liver. ENDOSCOPIC PROCEDURES:  9/2014. EGD by SALOMÓN. Small esophageal varices. No banding performed. Gastitis. H.Pylori negative. 9/2015. EGD by Dr. Gisselle Calderon. Normal esophagus. Repeat in two years. 11/2017. EGD by Dr. Sary Love. Simple esophageal varices, portal hypertensive gastropathy. RADIOLOGY:  6/2014. Ultrasound of liver. Echogenic consistent with cirrhosis. No liver mass lesions. No dilated bile ducts. No ascites. Recanalization of umbilical vein. 6/2014. CT scan abdomen with IV contrast.  Changes consistent with cirrhosis. No liver mass lesions. No dilated bile ducts. No ascites. 01/2015. Ultrasound of liver. Diffusely coarsened and echogenic hepatic echotexture. No focal lesion identified. 05/2015. Ultrasound of the liver. Compatible with cirrhosis. No focal hepatic lesion. 05/2015. CT of the liver. Cirrhotic appearing liver.  No focal hepatic lesion. Findings in keeping with portal hypertension, no ascites. 11/2015. US of the liver. Small echogenic liver compatible with history of cirrhosis, with no new focal intrahepatic lesion. 09/2016. Ultrasound of the liver. Consistent with cirrhosis. No hepatic masses. 05/2017. Ultrasound of the liver. Nodular hepatic contour with underlying parenchymal stigmata of cirrhosis. No focal hepatic lesion. 10/2017. Ultrasound of the liver. Heterogeneous increase in echogenicity without focal lesion. Possible recanalized umbilical vein. OTHER TESTING:  Not available or performed    ASSESSMENT AND PLAN:  Cryptogenic cirrhosis. This is likely secondary to BOURNE given DM and the finding of minimal steatosis on the liver biopsy. However, she does have positive autoimmune markers and manifests some autoimmune disorders, so this could be of resolving autoimmune etiology. The most recent laboratory studies indicate that the liver transaminases are normal, alkaline phosphatase is elevated, tests of hepatic synthetic and metabolic function are normal, and the platelet count is depressed. Complications of cirrhosis were discussed in detail. We discussed thrombocytopenia, portal hypertension, varices, GI bleeding, peripheral edema, ascites, hepatic encephalopathy, and hepatocellular carcinoma. We discussed the need for follow ups on a regular basis to monitor for complications. We discussed the need for every 6 month liver imaging studies. The patient does not require liver transplant evaluation at this time and may never require this if liver function remains normal.      EGD was performed in 11/2017 by Dr. Loyd Haney. Although the procedure is reported in \"care everywhere\", there is no actual report of the EGD findings. The patient reports that Dr. Loyd Haney told her there are \"two veins\" (presumably the mentioned \"simple esophogeal varices\") that he is concerned about.   She reports that he would like to start her on a medication for these, but her heart rate is too low. He must be referring to a beta-blocker, which is contraindicated in this patient due to bradycardia (rate of about 50). It does not appear that the varices were banded. Another finding of the EGD is portal hypertensive gastropathy. She was not started on a PPI. We will repeat the EGD in six months. There was no recommendation in the \"care everywhere\" report as to when the EGD should be repeated that I can find. Hepatic encephalopathy has not developed to date. There is no need for treatment with lactulose and/or xifaxan at this time. The patient has developed minimal edema which was well controlled with low dose diuretics until about a month ago. I increased her diuretics to step one today. She had been on 50 mg aldactone alone. Salt restrictions discussed. The patient was directed to continue all current medications at the current dosages. There are no contraindications for the patient to take any medications that are necessary for treatment of other medical issues. The patient was counseled regarding alcohol consumption. Thrombocytopenia secondary to cirrhosis. There is no evidence of overt bleeding. There is no indication for platelet transfusions or pharmacologic treatment to increase the platelet count. Vaccination for viral hepatitis A is not needed. The patient has serologic evidence of prior exposure or vaccination with immunity. [address] screening has recently been performed and does not suggest [address] Repeat imaging in 11/2017. This was ordered today. 25 minutes total time spent with this patient with more than 50% of this time spent counseling and coordinating care as described above. [address] [address] in 2 months.     Anna Zuñiga NP   Liver [place] of [address] [place] [address], [address] [place] [address] [id number] [phone]

## 2017-11-22 LAB
AFP L3 MFR SERPL: 6.9 % (ref 0–9.9)
AFP SERPL-MCNC: 5.4 NG/ML (ref 0–8)

## 2018-01-15 ENCOUNTER — OFFICE VISIT (OUTPATIENT)
Dept: HEMATOLOGY | Age: 68
End: 2018-01-15

## 2018-01-15 ENCOUNTER — HOSPITAL ENCOUNTER (OUTPATIENT)
Dept: LAB | Age: 68
Discharge: HOME | End: 2018-01-15
Payer: MEDICARE

## 2018-01-15 VITALS
HEART RATE: 56 BPM | SYSTOLIC BLOOD PRESSURE: 130 MMHG | DIASTOLIC BLOOD PRESSURE: 56 MMHG | TEMPERATURE: 97.8 F | RESPIRATION RATE: 14 BRPM | WEIGHT: 168.4 LBS | BODY MASS INDEX: 28.75 KG/M2 | OXYGEN SATURATION: 97 % | HEIGHT: 64 IN

## 2018-01-15 DIAGNOSIS — E20.8 OTHER HYPOPARATHYROIDISM (HCC): ICD-10-CM

## 2018-01-15 DIAGNOSIS — R68.89 OTHER GENERAL SYMPTOMS AND SIGNS: ICD-10-CM

## 2018-01-15 DIAGNOSIS — K74.60 CIRRHOSIS OF LIVER WITHOUT ASCITES, UNSPECIFIED HEPATIC CIRRHOSIS TYPE (HCC): Primary | ICD-10-CM

## 2018-01-15 DIAGNOSIS — K74.60 CIRRHOSIS OF LIVER WITHOUT ASCITES, UNSPECIFIED HEPATIC CIRRHOSIS TYPE (HCC): ICD-10-CM

## 2018-01-15 LAB
ALBUMIN SERPL-MCNC: 3.4 G/DL (ref 3.4–5)
ALBUMIN/GLOB SERPL: 0.8 {RATIO} (ref 0.8–1.7)
ALP SERPL-CCNC: 175 U/L (ref 45–117)
ALT SERPL-CCNC: 43 U/L (ref 13–56)
ANION GAP SERPL CALC-SCNC: 10 MMOL/L (ref 3–18)
AST SERPL-CCNC: 44 U/L (ref 15–37)
BASOPHILS # BLD: 0 K/UL (ref 0–0.06)
BASOPHILS NFR BLD: 1 % (ref 0–2)
BILIRUB DIRECT SERPL-MCNC: 0.6 MG/DL (ref 0–0.2)
BILIRUB SERPL-MCNC: 1.7 MG/DL (ref 0.2–1)
BUN SERPL-MCNC: 12 MG/DL (ref 7–18)
BUN/CREAT SERPL: 13 (ref 12–20)
CALCIUM SERPL-MCNC: 10.1 MG/DL (ref 8.5–10.1)
CHLORIDE SERPL-SCNC: 102 MMOL/L (ref 100–108)
CO2 SERPL-SCNC: 30 MMOL/L (ref 21–32)
CREAT SERPL-MCNC: 0.96 MG/DL (ref 0.6–1.3)
DIFFERENTIAL METHOD BLD: ABNORMAL
EOSINOPHIL # BLD: 0.2 K/UL (ref 0–0.4)
EOSINOPHIL NFR BLD: 4 % (ref 0–5)
ERYTHROCYTE [DISTWIDTH] IN BLOOD BY AUTOMATED COUNT: 13.5 % (ref 11.6–14.5)
FERRITIN SERPL-MCNC: 92 NG/ML (ref 8–388)
FOLATE SERPL-MCNC: 17.6 NG/ML (ref 3.1–17.5)
GLOBULIN SER CALC-MCNC: 4.2 G/DL (ref 2–4)
GLUCOSE SERPL-MCNC: 211 MG/DL (ref 74–99)
HCT VFR BLD AUTO: 40.4 % (ref 35–45)
HGB BLD-MCNC: 14.1 G/DL (ref 12–16)
INR PPP: 1.1 (ref 0.8–1.2)
IRON SATN MFR SERPL: 32 %
IRON SERPL-MCNC: 107 UG/DL (ref 50–175)
LYMPHOCYTES # BLD: 1.6 K/UL (ref 0.9–3.6)
LYMPHOCYTES NFR BLD: 29 % (ref 21–52)
MCH RBC QN AUTO: 32.6 PG (ref 24–34)
MCHC RBC AUTO-ENTMCNC: 34.9 G/DL (ref 31–37)
MCV RBC AUTO: 93.3 FL (ref 74–97)
MONOCYTES # BLD: 0.4 K/UL (ref 0.05–1.2)
MONOCYTES NFR BLD: 7 % (ref 3–10)
NEUTS SEG # BLD: 3.3 K/UL (ref 1.8–8)
NEUTS SEG NFR BLD: 59 % (ref 40–73)
PLATELET # BLD AUTO: 104 K/UL (ref 135–420)
PMV BLD AUTO: 11.4 FL (ref 9.2–11.8)
POTASSIUM SERPL-SCNC: 4 MMOL/L (ref 3.5–5.5)
PROT SERPL-MCNC: 7.6 G/DL (ref 6.4–8.2)
PROTHROMBIN TIME: 14 SEC (ref 11.5–15.2)
RBC # BLD AUTO: 4.33 M/UL (ref 4.2–5.3)
SODIUM SERPL-SCNC: 142 MMOL/L (ref 136–145)
TIBC SERPL-MCNC: 336 UG/DL (ref 250–450)
VIT B12 SERPL-MCNC: 709 PG/ML (ref 211–911)
WBC # BLD AUTO: 5.5 K/UL (ref 4.6–13.2)

## 2018-01-15 PROCEDURE — 36415 COLL VENOUS BLD VENIPUNCTURE: CPT | Performed by: NURSE PRACTITIONER

## 2018-01-15 PROCEDURE — 80076 HEPATIC FUNCTION PANEL: CPT | Performed by: NURSE PRACTITIONER

## 2018-01-15 PROCEDURE — 82607 VITAMIN B-12: CPT | Performed by: NURSE PRACTITIONER

## 2018-01-15 PROCEDURE — 82728 ASSAY OF FERRITIN: CPT | Performed by: NURSE PRACTITIONER

## 2018-01-15 PROCEDURE — 85610 PROTHROMBIN TIME: CPT | Performed by: NURSE PRACTITIONER

## 2018-01-15 PROCEDURE — 82652 VIT D 1 25-DIHYDROXY: CPT | Performed by: NURSE PRACTITIONER

## 2018-01-15 PROCEDURE — 82107 ALPHA-FETOPROTEIN L3: CPT | Performed by: NURSE PRACTITIONER

## 2018-01-15 PROCEDURE — 83540 ASSAY OF IRON: CPT | Performed by: NURSE PRACTITIONER

## 2018-01-15 PROCEDURE — 85025 COMPLETE CBC W/AUTO DIFF WBC: CPT | Performed by: NURSE PRACTITIONER

## 2018-01-15 PROCEDURE — 80048 BASIC METABOLIC PNL TOTAL CA: CPT | Performed by: NURSE PRACTITIONER

## 2018-01-15 NOTE — MR AVS SNAPSHOT
[address] [place] [address] 
[phone] Patient: Rodo Chase MRN: [id number] Visit Information Date & Time Provider Department Dept. Phone Encounter #  
 1/15/2018  2:00 PM [facility] [address] [id number] Follow-up Instructions Return in about 3 months (around 4/15/2018). Upcoming Health Maintenance Date Due HEMOGLOBIN A1C Q6M 1950 LIPID PANEL Q1 1950 FOOT EXAM Q1 12/4/1958 MICROALBUMIN Q1 12/4/1958 EYE EXAM RETINAL OR DILATED Q1 12/4/1958 DTaP/Tdap/Td series (1 - Tdap) 12/4/1969 BREAST CANCER SCRN MAMMOGRAM 12/4/1998 FOBT Q 1 YEAR AGE 50-75 12/4/1998 ZOSTER VACCINE AGE 60> 10/4/2008 GLAUCOMA SCREENING Q2Y 12/4/2013 OSTEOPOROSIS SCREENING (DEXA) 12/4/2013 Pneumococcal 65+ Low/Medium Risk (1 of 2 - PCV13) 12/4/2013 MEDICARE YEARLY EXAM 12/4/2013 Influenza Age 5 to Adult 8/1/2017 Allergies as of 1/15/2018  Review Complete On: 1/15/2018 By: Pascual Lord NP No Known Allergies Current Immunizations  Never Reviewed No immunizations on file. Not reviewed this visit You Were Diagnosed With   
  
 Codes Comments Cirrhosis of liver without ascites, unspecified hepatic cirrhosis type ([facility]    -  Primary ICD-10-CM: K74.60 ICD-9-CM: 571.5 Other general symptoms and signs     ICD-10-CM: R68.89 ICD-9-CM: 780.99 Other hypoparathyroidism ([facility]     ICD-10-CM: E20.8 ICD-9-CM: 252.1 Vitals BP Pulse Temp Resp Height(growth percentile) 130/56 (BP 1 Location: Left arm, BP Patient Position: Sitting) (!) 56 97.8 °F (36.6 °C) (Tympanic) 14 5' 4\" (1.626 m) Weight(growth percentile) SpO2 BMI OB Status Smoking Status 168 lb 6.4 oz (76.4 kg) 97% 28.91 kg/m2 Hysterectomy Never Smoker Vitals History BMI and BSA Data Body Mass Index Body Surface Area  
 28.91 kg/m 2 1.86 m 2 Preferred Pharmacy Pharmacy Name Phone [facility] [phone] Your Updated Medication List  
  
   
This list is accurate as of: 1/15/18  2:44 PM.  Always use your most recent med list.  
  
  
  
  
 furosemide 20 mg tablet Commonly known as:  LASIX  
take together with the aldactone. insulin  unit/mL injection Commonly known as:  NOVOLIN N, HUMULIN N  
35 Units by SubCUTAneous route two (2) times a day. levothyroxine 100 mcg tablet Commonly known as:  SYNTHROID Take 100 mcg by mouth Daily (before breakfast). Indications: HYPOTHYROIDISM  
  
 spironolactone 50 mg tablet Commonly known as:  ALDACTONE Take 1 Tab by mouth daily. Follow-up Instructions Return in about 3 months (around 4/15/2018). To-Do List   
 02/14/2018 GI:  EGD Introducing [facility] [place] [facility] SERVICES! [place] introduces [employer] patient portal. Now you can access parts of your medical record, email your doctor's office, and request medication refills online. 1. In your internet browser, go to [email or web address] 2. Click on the First Time User? Click Here link in the Sign In box. You will see the New Member Sign Up page. 3. Enter your [employer] Access Code exactly as it appears below. You will not need to use this code after youve completed the sign-up process. If you do not sign up before the expiration date, you must request a new code. · [employer] Access Code: [email or web address] Expires: 1/18/2018  5:01 PM 
 
4. Enter the last four digits of your Social Security Number (xxxx) and Date of Birth (mm/dd/yyyy) as indicated and click Submit. You will be taken to the next sign-up page. 5. Create a Ide[employer] ID. This will be your [employer] login ID and cannot be changed, so think of one that is secure and easy to remember. 6. Create a Spritz password. You can change your password at any time. 7. Enter your Password Reset Question and Answer. This can be used at a later time if you forget your password. 8. Enter your e-mail address. You will receive e-mail notification when new information is available in [address] 9. Click Sign Up. You can now view and download portions of your medical record. 10. Click the Download Summary menu link to download a portable copy of your medical information. If you have questions, please visit the Frequently Asked Questions section of the Sp[employer] website. Remember, Spritz is NOT to be used for urgent needs. For medical emergencies, dial 911. Now available from your iPhone and Android! Please provide this summary of care documentation to your next provider. Your primary care clinician is listed as Ulises More. If you have any questions after today's visit, please call [phone].

## 2018-01-15 NOTE — PROGRESS NOTES
1. Have you been to the ER, urgent care clinic since your last visit? Hospitalized since your last visit? No    2. Have you seen or consulted any other health care providers outside of the [address] [place] [address] since your last visit? Include any pap smears or colon screening.  No

## 2018-01-15 NOTE — PROGRESS NOTES
[address] MD Jesus, [address], [facility], [place]       DANIELE Ruiz PA-C Rosalina Case, [facility]   Leroy Villanueva, DANIELE Lozano NP        at [address], [address] [address]     [phone]     FAX: [phone]    at [address], [address]     [phone]     FAX: [phone]       Patient Care Team:  Perry Castellanos DO as PCP - General (Family Practice)  Cassius Padgett MD (Internal Medicine)  Brooke Hearn MD (Endocrinology)  Miguel Ángel Bermudez MD (Gastroenterology)      Problem List  Date Reviewed: 1/15/2018          Codes Class Noted    Cirrhosis, cryptogenic [facility]) ICD-10-CM: K74.69  ICD-9-CM: 571.5  7/26/2014        Diabetes mellitus ([facility] ICD-10-CM: E11.9  ICD-9-CM: 250.00  7/21/2014        Hypothyroidism ICD-10-CM: E03.9  ICD-9-CM: 244.9  7/21/2014        S/P JAYY (total abdominal hysterectomy) ICD-10-CM: Z90.710  ICD-9-CM: V88.01  7/21/2014        S/P cataract surgery ICD-10-CM: Z98.49  ICD-9-CM: V45.61  7/21/2014        Previous back surgery ICD-10-CM: Z98.890  ICD-9-CM: V45.89  7/21/2014                Jose Ellis returns to the [address] for management of cryptogenic cirrhosis. The active problem list, all pertinent past medical history, medications, liver histology, radiologic findings and laboratory findings related to the liver disorder were reviewed with the patient. The patient has not developed ascites. She may have some fluid build up now. Edema had resolved with low dose of diuretics. She has some trace lower extremity edema now. The patient has not developed hepatic encephalopathy. Imaging of the liver was performed in 10/2017 with ultrasound. Consistent with cirrhosis. No focal lesion identified.      Shear wave elastography was performed in 09/2016. This suggests cirrhosis and fatty liver. EGD in 9/2015 demonstrated normal esophagus. The EGD was repeated by Dr. Yennifer Park in 11/2017. This demonstrated simple esophageal varices and portal hypertensive gastropathy. The patient underwent a liver biopsy in 09/2014. Dr. Zuleima Knox has reviewed the biopsy slides. This demonstrates inactive cirrhosis with only minimal steatosis and no inflammation. The most recent laboratory studies indicate that the liver transaminases are normal, alkaline phosphatase is elevated, tests of hepatic synthetic and metabolic function are normal, and the platelet count is depressed. The patient has no complaints which can be attributed to liver disease. The patient denies fatigue since last visit. The patient completes all daily activities without any functional limitations. The patient has not experienced fevers, chills, shortness of breath, chest pain, pain in the right side over the liver, diffuse abdominal pain, nausea, vomiting, constipation, diarrhrea, dry eyes, dry mouth, arthralgias, myalgias, yellowing of the eyes or skin, itching, dark urine, problems concentrating, swelling of the abdomen, swelling of the lower extremities, hematemesis, or hematochezia. ALLERGIES:  No Known Allergies    MEDICATIONS  Current Outpatient Prescriptions   Medication Sig    insulin NPH (NOVOLIN N, HUMULIN N) 100 unit/mL injection 35 Units by SubCUTAneous route two (2) times a day.  spironolactone (ALDACTONE) 50 mg tablet Take 1 Tab by mouth daily. (Patient taking differently: Take 100 mg by mouth daily.)    furosemide (LASIX) 20 mg tablet take together with the aldactone. (Patient taking differently: 40 mg. take together with the aldactone.)    levothyroxine (SYNTHROID) 100 mcg tablet Take 100 mcg by mouth Daily (before breakfast). Indications: HYPOTHYROIDISM     No current facility-administered medications for this visit.         REVIEW OF SYSTEMS:  Systems related to all organ systems were reviewed. All were negative except as noted above. FAMILY HISTORY:  The father  of CVA. The mother  of cirrhosis. She did not consume alcohol. There are no other family members with liver disease. SOCIAL HISTORY:  The patient is . The patient has 4 children, and 8 grandchildren. The patient has never used tobacco products. The patient has never consumed significant amounts of alcohol. The patient currently works part time for a . PHYSICAL EXAMINATION:  Visit Vitals    /56 (BP 1 Location: Left arm, BP Patient Position: Sitting)    Pulse (!) 56    Temp 97.8 °F (36.6 °C) (Tympanic)    Resp 14    Ht 5' 4\" (1.626 m)    Wt 168 lb 6.4 oz (76.4 kg)    SpO2 97%    BMI 28.91 kg/m2     General: No acute distress. Eyes: Sclera anicteric. ENT: No oral lesions. Thyroid normal.  Nodes: No adenopathy. Skin: No spider angiomata. No jaundice. No palmar erythema. Respiratory: Lungs clear to auscultation. Cardiovascular: Regular heart rate. No murmurs. No JVD. Abdomen: Soft non-tender. Liver size normal to percussion/palpation. Spleen not palpable. No obvious ascites. Extremities: No edema. No muscle wasting. No gross arthritic changes. Neurologic: Alert and oriented. Cranial nerves grossly intact. No asterixsis.     LABORATORY STUDIES:    [facility] [place] of [address] [place] [address] 2017 10/11/2017   WBC 4.6 - 13.2 K/uL 4.4 (L) 4.0 (L)   ANC 1.8 - 8.0 K/UL 2.5 2.1   HGB 12.0 - 16.0 g/dL 14.3 13.2    - 420 K/uL 106 (L) 91 (L)   INR 0.8 - 1.2   1.1 1.1   AST 15 - 37 U/L 53 (H) 37   ALT 13 - 56 U/L 47 38   Alk Phos 45 - 117 U/L 181 (H) 143 (H)   Bili, Total 0.2 - 1.0 MG/DL 1.4 (H) 1.5 (H)   Bili, Direct 0.0 - 0.2 MG/DL 0.4 (H) 0.4 (H)   Albumin 3.4 - 5.0 g/dL 3.4 3.2 (L)   BUN 7.0 - 18 MG/DL 12 12   Creat 0.6 - 1.3 MG/DL 0.82 0.83   Na 136 - 145 mmol/L 145 139   K 3.5 - 5.5 mmol/L 4.0 4.0   Cl 100 - 108 mmol/L 102 101   CO2 21 - 32 mmol/L 31 29   Glucose 74 - 99 mg/dL 167 (H) 256 (H)     Cancer Screening Latest Ref Rng & Units 10/11/2017 2/9/2017 7/26/2016   AFP, Serum 0.0 - 8.0 ng/mL 3.4 4.4 5.7   AFP-L3% 0.0 - 9.9 % Comment Comment 6.5     SEROLOGIES:  Serologies Latest Ref Rng 7/21/2014   Hep A Ab, Total Negative Positive (A)   Hep B Surface Ag Negative Negative   Hep B Core Ab, Total Negative Negative   Ferritin 15 - 150 ng/mL 62   Iron % Saturation 15 - 55 % 29   SONIA, IFA  Negative   ASMCA 0 - 19 Units 8   M2 Ab 0.0 - 20.0 Units 11.4   Ceruloplasmin 16.0 - 45.0 mg/dL 30.3   Alpha-1 antitrypsin level 90 - 200 mg/dL 199     7/2014. Anti-HBsurface negative, anti-HCV negative. LIVER HISTOLOGY:  8/2014. Slides reviewed by SALOMÓN. Cirrhosis with minimal steatosis and no inflammation. Etiology most likely BOURNE.  09/2016. Shear wave elastography. E median is 16.77 kPa. This is highly suggestive of cirrhosis. The E Std is 4.76. This wide distrubution of data indicates fatty liver. ENDOSCOPIC PROCEDURES:  9/2014. EGD by SALOMÓN. Small esophageal varices. No banding performed. Gastitis. H.Pylori negative. 9/2015. EGD by Dr. Ernesto Alvarado. Normal esophagus. Repeat in two years. 11/2017. EGD by Dr. Elmo Mccormack. Simple esophageal varices, portal hypertensive gastropathy. RADIOLOGY:  6/2014. Ultrasound of liver. Echogenic consistent with cirrhosis. No liver mass lesions. No dilated bile ducts. No ascites. Recanalization of umbilical vein. 6/2014. CT scan abdomen with IV contrast.  Changes consistent with cirrhosis. No liver mass lesions. No dilated bile ducts. No ascites. 01/2015. Ultrasound of liver. Diffusely coarsened and echogenic hepatic echotexture. No focal lesion identified. 05/2015. Ultrasound of the liver. Compatible with cirrhosis. No focal hepatic lesion. 05/2015. CT of the liver. Cirrhotic appearing liver. No focal hepatic lesion. Findings in keeping with portal hypertension, no ascites. 11/2015. US of the liver. Small echogenic liver compatible with history of cirrhosis, with no new focal intrahepatic lesion. 09/2016. Ultrasound of the liver. Consistent with cirrhosis. No hepatic masses. 05/2017. Ultrasound of the liver. Nodular hepatic contour with underlying parenchymal stigmata of cirrhosis. No focal hepatic lesion. 10/2017. Ultrasound of the liver. Heterogeneous increase in echogenicity without focal lesion. Possible recanalized umbilical vein. OTHER TESTING:  Not available or performed    ASSESSMENT AND PLAN:  Cryptogenic cirrhosis. This is likely secondary to BOURNE given DM and the finding of minimal steatosis on the liver biopsy. However, she does have positive autoimmune markers and manifests some autoimmune disorders, so this could be of resolving autoimmune etiology. The most recent laboratory studies indicate that the liver transaminases are normal, alkaline phosphatase is elevated, tests of hepatic synthetic and metabolic function are normal, and the platelet count is depressed. Complications of cirrhosis were discussed in detail. We discussed thrombocytopenia, portal hypertension, varices, GI bleeding, peripheral edema, ascites, hepatic encephalopathy, and hepatocellular carcinoma. We discussed the need for follow ups on a regular basis to monitor for complications. We discussed the need for every 6 month liver imaging studies. The patient does not require liver transplant evaluation at this time and may never require this if liver function remains normal.      EGD was performed in 11/2017 by Dr. Lamberto Paredes. Although the procedure is reported in \"care everywhere\",  there is no actual report of the EGD findings. The patient reports that Dr. Lamberto Paredes told her there are \"two veins\" (presumably the mentioned \"simple esophogeal varices\") that he is concerned about.   She reports that he would like to start her on a medication for these, but her heart rate is too low. He must be referring to a beta-blocker, which is contraindicated in this patient due to bradycardia (rate of about 50). It does not appear that the varices were banded. Another finding of the EGD is portal hypertensive gastropathy. She was not started on a PPI. We will repeat the EGD. There was no recommendation in the \"care everywhere\" report as to when the EGD should be repeated that I can find. I ordered an EGD today to be performed here. Hepatic encephalopathy has not developed to date. There is no need for treatment with lactulose and/or xifaxan at this time. The patient has developed minimal edema which was well controlled with low dose diuretics until about 3 months ago. I increased her diuretics to step one at her last office visit in 11/2017. She had been on 50 mg aldactone alone. Salt restrictions discussed. Continue step diuretics. The patient was directed to continue all current medications at the current dosages. There are no contraindications for the patient to take any medications that are necessary for treatment of other medical issues. The patient was counseled regarding alcohol consumption. Thrombocytopenia secondary to cirrhosis. There is no evidence of overt bleeding. There is no indication for platelet transfusions or pharmacologic treatment to increase the platelet count. Vaccination for viral hepatitis A is not needed. The patient has serologic evidence of prior exposure or vaccination with immunity. [facility] screening has recently been performed and does not suggest [facility] Repeat imaging in 05/2018.      25 minutes total time spent with this patient with more than 50% of this time spent counseling and coordinating care as described above. [address] [place] [address] 87 in 3 months.     Darling Jones NP   [facility] [place] of [address] [place] [address] [facility] [address], [address] [facility]  [phone]

## 2018-01-16 LAB
1,25(OH)2D3 SERPL-MCNC: 33.8 PG/ML (ref 19.9–79.3)
AFP L3 MFR SERPL: 6.7 % (ref 0–9.9)
AFP SERPL-MCNC: 5.2 NG/ML (ref 0–8)

## 2018-01-24 NOTE — PROGRESS NOTES
Called the patient and relayed results of lab work to her. PT states she needed a refill on Spironolactone and Lasix and Flavio could call these in to her pharmacy.

## 2018-01-31 ENCOUNTER — TELEPHONE (OUTPATIENT)
Dept: HEMATOLOGY | Age: 68
End: 2018-01-31

## 2018-01-31 RX ORDER — SPIRONOLACTONE 100 MG/1
100 TABLET, FILM COATED ORAL DAILY
Qty: 30 TAB | Refills: 4 | Status: SHIPPED | OUTPATIENT
Start: 2018-01-31 | End: 2018-08-22 | Stop reason: SDUPTHER

## 2018-01-31 RX ORDER — FUROSEMIDE 40 MG/1
40 TABLET ORAL DAILY
Qty: 30 TAB | Refills: 4 | Status: SHIPPED | OUTPATIENT
Start: 2018-01-31 | End: 2018-10-05 | Stop reason: SDUPTHER

## 2018-01-31 NOTE — TELEPHONE ENCOUNTER
Patient is waiting on a prescription refill for Lasix and Spironolactone. Pharmacy: [facility] she can be reach by her work number [phone].

## 2018-03-21 ENCOUNTER — HOSPITAL ENCOUNTER (OUTPATIENT)
Age: 68
Setting detail: OUTPATIENT SURGERY
Discharge: HOME | End: 2018-03-21
Attending: INTERNAL MEDICINE | Admitting: INTERNAL MEDICINE
Payer: MEDICARE

## 2018-03-21 VITALS
BODY MASS INDEX: 29.24 KG/M2 | HEART RATE: 55 BPM | WEIGHT: 175.5 LBS | DIASTOLIC BLOOD PRESSURE: 63 MMHG | SYSTOLIC BLOOD PRESSURE: 132 MMHG | OXYGEN SATURATION: 95 % | RESPIRATION RATE: 18 BRPM | TEMPERATURE: 96 F | HEIGHT: 65 IN

## 2018-03-21 LAB — GLUCOSE BLD STRIP.AUTO-MCNC: 130 MG/DL (ref 70–110)

## 2018-03-21 PROCEDURE — 74011000250 HC RX REV CODE- 250: Performed by: INTERNAL MEDICINE

## 2018-03-21 PROCEDURE — 74011250636 HC RX REV CODE- 250/636

## 2018-03-21 PROCEDURE — 82962 GLUCOSE BLOOD TEST: CPT

## 2018-03-21 PROCEDURE — 74011250636 HC RX REV CODE- 250/636: Performed by: INTERNAL MEDICINE

## 2018-03-21 PROCEDURE — 76040000019: Performed by: INTERNAL MEDICINE

## 2018-03-21 RX ORDER — SODIUM CHLORIDE 0.9 % (FLUSH) 0.9 %
5-10 SYRINGE (ML) INJECTION AS NEEDED
Status: CANCELLED | OUTPATIENT
Start: 2018-03-21 | End: 2018-03-21

## 2018-03-21 RX ORDER — EPINEPHRINE 0.1 MG/ML
1 INJECTION INTRACARDIAC; INTRAVENOUS
Status: CANCELLED | OUTPATIENT
Start: 2018-03-21 | End: 2018-03-21

## 2018-03-21 RX ORDER — MIDAZOLAM HYDROCHLORIDE 1 MG/ML
.25-5 INJECTION, SOLUTION INTRAMUSCULAR; INTRAVENOUS
Status: DISCONTINUED | OUTPATIENT
Start: 2018-03-21 | End: 2018-03-21 | Stop reason: HOSPADM

## 2018-03-21 RX ORDER — FENTANYL CITRATE 50 UG/ML
100 INJECTION, SOLUTION INTRAMUSCULAR; INTRAVENOUS
Status: DISCONTINUED | OUTPATIENT
Start: 2018-03-21 | End: 2018-03-21 | Stop reason: HOSPADM

## 2018-03-21 RX ORDER — DEXTROMETHORPHAN/PSEUDOEPHED 2.5-7.5/.8
1.2 DROPS ORAL
Status: CANCELLED | OUTPATIENT
Start: 2018-03-21

## 2018-03-21 RX ORDER — NALOXONE HYDROCHLORIDE 0.4 MG/ML
0.4 INJECTION, SOLUTION INTRAMUSCULAR; INTRAVENOUS; SUBCUTANEOUS
Status: DISCONTINUED | OUTPATIENT
Start: 2018-03-21 | End: 2018-03-21 | Stop reason: HOSPADM

## 2018-03-21 RX ORDER — FLUMAZENIL 0.1 MG/ML
0.2 INJECTION INTRAVENOUS
Status: DISCONTINUED | OUTPATIENT
Start: 2018-03-21 | End: 2018-03-21 | Stop reason: HOSPADM

## 2018-03-21 RX ORDER — SODIUM CHLORIDE 0.9 % (FLUSH) 0.9 %
5-10 SYRINGE (ML) INJECTION EVERY 8 HOURS
Status: CANCELLED | OUTPATIENT
Start: 2018-03-21 | End: 2018-03-21

## 2018-03-21 RX ORDER — SODIUM CHLORIDE 9 MG/ML
100 INJECTION, SOLUTION INTRAVENOUS CONTINUOUS
Status: DISCONTINUED | OUTPATIENT
Start: 2018-03-21 | End: 2018-03-21 | Stop reason: HOSPADM

## 2018-03-21 RX ORDER — ATROPINE SULFATE 0.1 MG/ML
0.5 INJECTION INTRAVENOUS
Status: CANCELLED | OUTPATIENT
Start: 2018-03-21 | End: 2018-03-21

## 2018-03-21 RX ADMIN — SODIUM CHLORIDE 100 ML/HR: 900 INJECTION, SOLUTION INTRAVENOUS at 07:52

## 2018-03-21 NOTE — PROCEDURES
[address] [facility] Caroline Spangler MD, [address], [place], [place]       DANIELE Solomon PA-C Larene Pronto, [facility]   DANIELE Avalos NP [address] [address]    at [address], [address] [address]    [phone]    FAX: [address] [place] [address], [address]    [phone]    FAX: [phone]       UPPER ENDOSCOPY PROCEDURE NOTE    Montse Burgos  1950    INDICATION: Cirrhosis. Screening for esophageal varices with variceal ligation if  indicated. : Cristina Diaz MD    ANESTHESIA/SEDATION: Versed 4 mg and Fentanyl 75 mcg      PROCEDURE DESCRIPTION:  [employer] consent was obtained from the patient for the procedure. All risks and benefits of the procedure explained. The patient was taken to the endoscopy suite and placed in the left lateral decubitus position. Following sequential administration of sedation to doses as indicated above the endoscope was inserted into the mouth and advanced under direct vision to the second portion of the duodenum. Careful inspection of upper gastrointestinal tract was made as the endoscope was inserted and withdrawn. Retroflexion of the endoscope to view of the cardia of the stomach was performed. The findings and interventions are described below. FINDINGS:  Esophagus:    Small esophageal varices. No banding performed. Stomach:   Mild portal hypertensive gastropathy of the body of the stomach. No gastric varices identified. Duodenum:   Normal bulb and second portion    INTERVENTION:   2 biopsies were taken from the antrum.   The specimens were placed in preservative and transported to Pathology after the procedure. COMPLICATIONS: None. The patient tolerated the procedure well. EBL: Negligible. RECOMMENDATIONS:  Observe until discharge parameters are achieved. May resume previous diet. Repeat endoscopy to reassess varices and need for banding in 1 year. Follow-up [facility] office as scheduled.       Edilson Hernandez MD  3/21/2018  9:01 AM

## 2018-03-21 NOTE — IP AVS SNAPSHOT
[address] [address] 
[phone] Patient: Rosio Proctor MRN: [id number] SVR:[id number] About your hospitalization You were admitted on:  March 21, 2018 You last received care in the:  [facility] ENDOSCOPY You were discharged on:  March 21, 2018 Why you were hospitalized Your primary diagnosis was:  Not on File Follow-up Information Follow up With Details Comments Contact Info Marcelina Shafer MD   [address] [facility] [address] 1000 [facility] [address] [phone] Marcelina Shafer MD   200 [facility] [address] 1400 [facility] [address] 
[phone] Sangeeta Hartmann DO   409 [facility] [address] [address] 
[phone] Your Scheduled Appointments Thursday April 19, 2018  2:00 PM ED[place] [address] [facility] ([facility])  
 [address] [place] [address] 1000 [facility] [id number] [phone] Discharge Orders None A check berny indicates which time of day the medication should be taken. My Medications CONTINUE taking these medications Instructions Each Dose to Equal  
 Morning Noon Evening Bedtime  
 furosemide 40 mg tablet Commonly known as:  LASIX Your last dose was: Your next dose is: Take 1 Tab by mouth daily. take together with the aldactone. 40 mg  
    
   
   
   
  
 insulin  unit/mL injection Commonly known as:  Homer Erik Your last dose was: Your next dose is:    
   
   
 35 Units by SubCUTAneous route two (2) times a day. 35 Units  
    
   
   
   
  
 levothyroxine 100 mcg tablet Commonly known as:  SYNTHROID Your last dose was: Your next dose is: Take 100 mcg by mouth Daily (before breakfast). Indications: HYPOTHYROIDISM  
 100 mcg spironolactone 100 mg tablet Commonly known as:  ALDACTONE Your last dose was: Your next dose is: Take 1 Tab by mouth daily. 100 mg Discharge Instructions [address] [facility] Edilson Hernandez MD, Maritza Haynes, [facility] Fredia Gram, NP Brooke Goodell, PA-C Lea Shutter, [facility]   DANIELE Cummings NP [address] [facility] 136 
  at [address] 
  [address] [place] [address], [address] [address] Benítez  22. 
  [phone] FAX: [address] [place] [address] Avenue 
  [facility] 
  One [facility], [address] [facility] [address] [facility], [address] [place] [address] - [address] 
  [phone] FAX: [phone] ENDOSCOPY DISCHARGE INSTRUCTIONS Collette Morning 1950 Date: 3/21/2018 DISCOMFORT: 
Use lozenges or warm salt water gargle for sore thoat Apply warm compress to IV site if red. If redness or soreness persists call the office. You may experience gas and bloating. Walking and belching will help relieve this. You may experience chest discomfort or pressure especially if banding of esophageal varices was performed. DIET: 
You may resume your normal diet. ACTIVITY: 
Spend the remainder of the day resting. Avoid any strenuous activity. You may not operate a vehicle for 12 hours. You may not engage in an occupation involving machinery or appliances for rest of today. Avoid making any critical or financial decisions for at least 24 hour. Call the [employer] [address] if you have any of the following: 
Increasing chest or abdominal pain, nausea, vomiting, vomiting blood, abdominal distension or swelling, fever or chills, bloody discharge from nose or mouth or shortness of breath. Follow-up Instructions: Call Dr. Collin Villafuerte for any questions or problems at the phone number listed above. If a biopsy was performed, you will be contacted by the office staff or Dr Collin Villafuerte within 1 week. If you have not heard from us by then you may call the office at the phone number listed above to inquire about the results. ENDOSCOPY FINDINGS: 
Your endoscopy demonstrated mild swelling of the stomach. DISCHARGE SUMMARY from the Nurse: The following personal items collected during your admission are returned to you:  
Dental Appliance:   
Vision: Visual Aid: None Hearing Aid:   
Jewelry:   
Clothing:   
Other Valuables:   
Valuables sent to safe:   
 
 
Patient armband removed and shredded DISCHARGE SUMMARY from Nurse PATIENT INSTRUCTIONS: 
 
 
F-face looks uneven A-arms unable to move or move unevenly S-speech slurred or non-existent T-time-call 911 as soon as signs and symptoms begin-DO NOT go Back to bed or wait to see if you get better-TIME IS BRAIN. Warning Signs of HEART ATTACK Call 911 if you have these symptoms: 
? Chest discomfort. Most heart attacks involve discomfort in the center of the chest that lasts more than a few minutes, or that goes away and comes back. It can feel like uncomfortable pressure, squeezing, fullness, or pain. ? Discomfort in other areas of the upper body. Symptoms can include pain or discomfort in one or both arms, the back, neck, jaw, or stomach. ? Shortness of breath with or without chest discomfort. ? Other signs may include breaking out in a cold sweat, nausea, or lightheadedness. Don't wait more than five minutes to call [address]! Fast action can save your life.  Calling 911 is almost always the fastest way to get lifesaving treatment. Emergency Medical Services staff can begin treatment when they arrive  up to an hour sooner than if someone gets to the hospital by car. The discharge information has been reviewed with the patient and spouse. The patient and caregiver verbalized understanding. Discharge medications reviewed with the patient and caregiver and appropriate educational materials and side effects teaching were provided. ___________________________________________________________________________________________________________________________________ Introducing [facility]! [facility] introduces BluelightApp patient portal. Now you can access parts of your medical record, email your doctor's office, and request medication refills online. 1. In your internet browser, go to [email or web address]: Powerhart 2. Click on the First Time User? Click Here link in the Sign In box. You will see the New Member Sign Up page. 3. Enter your BluelightApp Access Code exactly as it appears below. You will not need to use this code after youve completed the sign-up process. If you do not sign up before the expiration date, you must request a new code. · BluelightApp Access Code: [email or web address] Expires: 6/19/2018  9:08 AM 
 
4. Enter the last four digits of your Social Security Number (xxxx) and Date of Birth (mm/dd/yyyy) as indicated and click Submit. You will be taken to the next sign-up page. 5. Create a BookNowt ID. This will be your BluelightApp login ID and cannot be changed, so think of one that is secure and easy to remember. 6. Create a BluelightApp password. You can change your password at any time. 7. Enter your Password Reset Question and Answer. This can be used at a later time if you forget your password. 8. Enter your e-mail address. You will receive e-mail notification when new information is available in [address] 9. Click Sign Up.  You can now view and download portions of your medical record. 10. Click the Download Summary menu link to download a portable copy of your medical information. If you have questions, please visit the Frequently Asked Questions section of the [email or web address] website. Remember, [email or web address] is NOT to be used for urgent needs. For medical emergencies, dial 911. Now available from your iPhone and Android! Providers Seen During Your Hospitalization Provider Specialty Primary office phone Zulay Boone MD Hepatology [phone] Your Primary Care Physician (PCP) Primary Care Physician Office Phone Office Fax Jetty [place] [phone] You are allergic to the following No active allergies Recent Documentation Height Weight BMI OB Status Smoking Status 1.651 m 79.6 kg 29.2 kg/m2 Hysterectomy Never Smoker Emergency Contacts Name Discharge Info Relation Home Work Mobile Monique Mari(Dghtr-N-Lw) DISCHARGE CAREGIVER [3] Daughter [21] [phone] Hamp Turpin CAREGIVER [3] Daughter [21] [phone] Patient Belongings The following personal items are in your possession at time of discharge: 
     Visual Aid: None Please provide this summary of care documentation to your next provider. Signatures-by signing, you are acknowledging that this After Visit Summary has been reviewed with you and you have received a copy. Patient Signature:  ____________________________________________________________ Date:  ____________________________________________________________  
  
Jennie Magallanes Provider Signature:  ____________________________________________________________ Date:  ____________________________________________________________

## 2018-03-21 NOTE — DISCHARGE INSTRUCTIONS
70 Adrien Krishnan MD, [address], [place], [place]       DANIELE Ferreira PA-C Recardo Lorenzo, [facility]-BC   DANIELE Fermin NP [address] [facility] [address]    at [address], [facility] [place] Ben[place]tez  22.    [phone]    FAX: [address] [place] [address]    at [address], [address]    [phone]    FAX: [phone]       ENDOSCOPY DISCHARGE INSTRUCTIONS      Chavo Mirandamarylou  1950  Date: 3/21/2018    DISCOMFORT:  Use lozenges or warm salt water gargle for sore thoat  Apply warm compress to IV site if red. If redness or soreness persists call the office. You may experience gas and bloating. Walking and belching will help relieve this. You may experience chest discomfort or pressure especially if banding of esophageal varices was performed. DIET:  You may resume your normal diet. ACTIVITY:  Spend the remainder of the day resting. Avoid any strenuous activity. You may not operate a vehicle for 12 hours. You may not engage in an occupation involving machinery or appliances for rest of today. Avoid making any critical or financial decisions for at least 24 hour. Call the [address] [facility] if you have any of the following:  Increasing chest or abdominal pain, nausea, vomiting, vomiting blood, abdominal distension or swelling, fever or chills, bloody discharge from nose or mouth or shortness of breath. Follow-up Instructions:  Call Dr. La Nena Galvin for any questions or problems at the phone number listed above. If a biopsy was performed, you will be contacted by the office staff or Dr La Nena Galvin within 1 week.    If you have not heard from us by then you may call the office at the phone number listed above to inquire about the results. ENDOSCOPY FINDINGS:  Your endoscopy demonstrated mild swelling of the stomach. DISCHARGE SUMMARY from the Nurse: The following personal items collected during your admission are returned to you:   Dental Appliance:    Vision: Visual Aid: None  Hearing Aid:    Jewelry:    Clothing:    Other Valuables:    Valuables sent to safe:        Patient armband removed and shredded    DISCHARGE SUMMARY from Nurse    PATIENT INSTRUCTIONS:    After general anesthesia or intravenous sedation, for 24 hours or while taking prescription Narcotics:  · Limit your activities  · Do not drive and operate hazardous machinery  · Do not make important personal or business decisions  · Do  not drink alcoholic beverages  · If you have not urinated within 8 hours after discharge, please contact your surgeon on call. Report the following to your surgeon:  · Excessive pain, swelling, redness or odor of or around the surgical area  · Temperature over 100.5  · Nausea and vomiting lasting longer than 4 hours or if unable to take medications  · Any signs of decreased circulation or nerve impairment to extremity: change in color, persistent  numbness, tingling, coldness or increase pain  · Any questions    What to do at Home:  Recommended activity: Activity as tolerated and no driving for today,     If you experience any of the following symptoms as above, please follow up with Dr. Mickeal Aase. *  Please give a list of your current medications to your Primary Care Provider. *  Please update this list whenever your medications are discontinued, doses are      changed, or new medications (including over-the-counter products) are added. *  Please carry medication information at all times in case of emergency situations.     These are general instructions for a healthy lifestyle:    No smoking/ No tobacco products/ Avoid exposure to second hand smoke  Surgeon General's Warning: Quitting smoking now greatly reduces serious risk to your health. Obesity, smoking, and sedentary lifestyle greatly increases your risk for illness    A healthy diet, regular physical exercise & weight monitoring are important for maintaining a healthy lifestyle    You may be retaining fluid if you have a history of heart failure or if you experience any of the following symptoms:  Weight gain of 3 pounds or more overnight or 5 pounds in a week, increased swelling in our hands or feet or shortness of breath while lying flat in bed. Please call your doctor as soon as you notice any of these symptoms; do not wait until your next office visit. Recognize signs and symptoms of STROKE:    F-face looks uneven    A-arms unable to move or move unevenly    S-speech slurred or non-existent    T-time-call 911 as soon as signs and symptoms begin-DO NOT go       Back to bed or wait to see if you get better-TIME IS BRAIN. Warning Signs of HEART ATTACK     Call 911 if you have these symptoms:   Chest discomfort. Most heart attacks involve discomfort in the center of the chest that lasts more than a few minutes, or that goes away and comes back. It can feel like uncomfortable pressure, squeezing, fullness, or pain.  Discomfort in other areas of the upper body. Symptoms can include pain or discomfort in one or both arms, the back, neck, jaw, or stomach.  Shortness of breath with or without chest discomfort.  Other signs may include breaking out in a cold sweat, nausea, or lightheadedness. Don't wait more than five minutes to call 911 - MINUTES MATTER! Fast action can save your life. Calling 911 is almost always the fastest way to get lifesaving treatment. Emergency Medical Services staff can begin treatment when they arrive -- up to an hour sooner than if someone gets to the hospital by car. The discharge information has been reviewed with the patient and spouse.   The patient and caregiver verbalized understanding. Discharge medications reviewed with the patient and caregiver and appropriate educational materials and side effects teaching were provided.   ___________________________________________________________________________________________________________________________________

## 2018-03-21 NOTE — H&P
70 Adrien Krishnan MD, FACP, [place], [place]       Joyce Estimable, NP    Fatemeh Chapa, VIVI Goode, ACNP-BC   Sravan Pena, DANIELE Hammer, DANIELE Bryant [facility] 136    at [place] [id number] [facility] [address], [address] Susannah Benítez  22.    [phone]    FAX: [address] [place] [address], [address]    [phone]    FAX: [phone]       PRE-PROCEDURE NOTE - EGD    H and P from last office visit reviewed. Allergies reviewed. Out-patient medicaton list reviewed. 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 Current Outpatient Prescriptions on File Prior to Encounter   Medication Sig Dispense Refill    spironolactone (ALDACTONE) 100 mg tablet Take 1 Tab by mouth daily. 30 Tab 4    furosemide (LASIX) 40 mg tablet Take 1 Tab by mouth daily. take together with the aldactone. 30 Tab 4    insulin NPH (NOVOLIN N, HUMULIN N) 100 unit/mL injection 35 Units by SubCUTAneous route two (2) times a day.  levothyroxine (SYNTHROID) 100 mcg tablet Take 100 mcg by mouth Daily (before breakfast). Indications: HYPOTHYROIDISM         For EGD to assess for esophageal and gastric varices. Plan to perform banding if indicated based upon variceal size and appearance. The risks of the procedure were discussed with the patient. These included reaction to anesthesia, pain, perforation and bleeding.   All questions were answered. The patient wishes to proceed with the procedure. PHYSICAL EXAMINATION:  VS: per nursing note  General: No acute distress. Eyes: Sclera anicteric. ENT: No oral lesions. Thyroid normal.  Nodes: No adenopathy. Skin: No spider angiomata. No jaundice. No palmar erythema. Respiratory: Lungs clear to auscultation. Cardiovascular: Regular heart rate. No murmurs. No JVD. Abdomen: Soft non-tender, liver size normal to percussion/palpation. Spleen not palpable. No obvious ascites. Extremities: No edema. No muscle wasting. No gross arthritic changes. Neurologic: Alert and oriented. Cranial nerves grossly intact. No asterixis. MOST RECENT LABORATORY STUDIES:  Lab Results   Component Value Date/Time    WBC 5.5 01/15/2018 03:12 PM    HGB 14.1 01/15/2018 03:12 PM    HCT 40.4 01/15/2018 03:12 PM    PLATELET 977 (L) 16/63/6087 03:12 PM    MCV 93.3 01/15/2018 03:12 PM     Lab Results   Component Value Date/Time    INR 1.1 01/15/2018 03:12 PM    INR 1.1 11/21/2017 10:32 AM    INR 1.1 10/11/2017 02:48 PM    Prothrombin time 14.0 01/15/2018 03:12 PM    Prothrombin time 13.6 11/21/2017 10:32 AM    Prothrombin time 13.5 10/11/2017 02:48 PM       ASSESSMENT AND PLAN:  EGD to assess for esophageal and/or gastric varices. Conscious sedation with fentanyl and versed.     Khushi Wilson MD  Liver Ramsey [address] [place] [address] 22. [phone]

## 2018-04-19 ENCOUNTER — HOSPITAL ENCOUNTER (OUTPATIENT)
Dept: LAB | Age: 68
Discharge: HOME | End: 2018-04-19
Payer: MEDICARE

## 2018-04-19 ENCOUNTER — OFFICE VISIT (OUTPATIENT)
Dept: HEMATOLOGY | Age: 68
End: 2018-04-19

## 2018-04-19 VITALS
BODY MASS INDEX: 27.99 KG/M2 | RESPIRATION RATE: 12 BRPM | HEART RATE: 60 BPM | DIASTOLIC BLOOD PRESSURE: 59 MMHG | SYSTOLIC BLOOD PRESSURE: 122 MMHG | WEIGHT: 168 LBS | TEMPERATURE: 99.4 F | HEIGHT: 65 IN | OXYGEN SATURATION: 96 %

## 2018-04-19 DIAGNOSIS — K74.69 CIRRHOSIS, CRYPTOGENIC (HCC): ICD-10-CM

## 2018-04-19 DIAGNOSIS — K74.69 CIRRHOSIS, CRYPTOGENIC (HCC): Primary | ICD-10-CM

## 2018-04-19 PROBLEM — E11.21 TYPE 2 DIABETES WITH NEPHROPATHY (HCC): Status: ACTIVE | Noted: 2018-04-19

## 2018-04-19 LAB
ALBUMIN SERPL-MCNC: 3.1 G/DL (ref 3.4–5)
ALBUMIN/GLOB SERPL: 0.7 {RATIO} (ref 0.8–1.7)
ALP SERPL-CCNC: 195 U/L (ref 45–117)
ALT SERPL-CCNC: 40 U/L (ref 13–56)
ANION GAP SERPL CALC-SCNC: 10 MMOL/L (ref 3–18)
AST SERPL-CCNC: 50 U/L (ref 15–37)
BASOPHILS # BLD: 0 K/UL (ref 0–0.06)
BASOPHILS NFR BLD: 0 % (ref 0–2)
BILIRUB DIRECT SERPL-MCNC: 0.7 MG/DL (ref 0–0.2)
BILIRUB SERPL-MCNC: 1.8 MG/DL (ref 0.2–1)
BUN SERPL-MCNC: 9 MG/DL (ref 7–18)
BUN/CREAT SERPL: 8 (ref 12–20)
CALCIUM SERPL-MCNC: 10 MG/DL (ref 8.5–10.1)
CHLORIDE SERPL-SCNC: 98 MMOL/L (ref 100–108)
CO2 SERPL-SCNC: 31 MMOL/L (ref 21–32)
CREAT SERPL-MCNC: 1.17 MG/DL (ref 0.6–1.3)
DIFFERENTIAL METHOD BLD: ABNORMAL
EOSINOPHIL # BLD: 0.3 K/UL (ref 0–0.4)
EOSINOPHIL NFR BLD: 4 % (ref 0–5)
ERYTHROCYTE [DISTWIDTH] IN BLOOD BY AUTOMATED COUNT: 13.4 % (ref 11.6–14.5)
GLOBULIN SER CALC-MCNC: 4.3 G/DL (ref 2–4)
GLUCOSE SERPL-MCNC: 420 MG/DL (ref 74–99)
HCT VFR BLD AUTO: 41.6 % (ref 35–45)
HGB BLD-MCNC: 13.9 G/DL (ref 12–16)
INR PPP: 1.1 (ref 0.8–1.2)
LYMPHOCYTES # BLD: 2.2 K/UL (ref 0.9–3.6)
LYMPHOCYTES NFR BLD: 31 % (ref 21–52)
MCH RBC QN AUTO: 31.7 PG (ref 24–34)
MCHC RBC AUTO-ENTMCNC: 33.4 G/DL (ref 31–37)
MCV RBC AUTO: 94.8 FL (ref 74–97)
MONOCYTES # BLD: 0.6 K/UL (ref 0.05–1.2)
MONOCYTES NFR BLD: 8 % (ref 3–10)
NEUTS SEG # BLD: 4 K/UL (ref 1.8–8)
NEUTS SEG NFR BLD: 57 % (ref 40–73)
PLATELET # BLD AUTO: 130 K/UL (ref 135–420)
PMV BLD AUTO: 11.5 FL (ref 9.2–11.8)
POTASSIUM SERPL-SCNC: 3.9 MMOL/L (ref 3.5–5.5)
PROT SERPL-MCNC: 7.4 G/DL (ref 6.4–8.2)
PROTHROMBIN TIME: 13.2 SEC (ref 11.5–15.2)
RBC # BLD AUTO: 4.39 M/UL (ref 4.2–5.3)
SODIUM SERPL-SCNC: 139 MMOL/L (ref 136–145)
WBC # BLD AUTO: 7.1 K/UL (ref 4.6–13.2)

## 2018-04-19 PROCEDURE — 85025 COMPLETE CBC W/AUTO DIFF WBC: CPT | Performed by: NURSE PRACTITIONER

## 2018-04-19 PROCEDURE — 80076 HEPATIC FUNCTION PANEL: CPT | Performed by: NURSE PRACTITIONER

## 2018-04-19 PROCEDURE — 82107 ALPHA-FETOPROTEIN L3: CPT | Performed by: NURSE PRACTITIONER

## 2018-04-19 PROCEDURE — 85610 PROTHROMBIN TIME: CPT | Performed by: NURSE PRACTITIONER

## 2018-04-19 PROCEDURE — 80048 BASIC METABOLIC PNL TOTAL CA: CPT | Performed by: NURSE PRACTITIONER

## 2018-04-19 PROCEDURE — 36415 COLL VENOUS BLD VENIPUNCTURE: CPT | Performed by: NURSE PRACTITIONER

## 2018-04-19 RX ORDER — HYDROCODONE POLISTIREX AND CHLORPHENIRAMINE POLISTIREX 10; 8 MG/5ML; MG/5ML
5 SUSPENSION, EXTENDED RELEASE ORAL
Qty: 115 ML | Refills: 0 | Status: SHIPPED | OUTPATIENT
Start: 2018-04-19 | End: 2018-07-23 | Stop reason: ALTCHOICE

## 2018-04-19 RX ORDER — AMOXICILLIN 875 MG/1
875 TABLET, FILM COATED ORAL 2 TIMES DAILY
COMMUNITY
End: 2018-07-23 | Stop reason: ALTCHOICE

## 2018-04-19 NOTE — PROGRESS NOTES
1. Have you been to the ER, urgent care clinic since your last visit? Hospitalized since your last visit? Yes When: [facility]    2. Have you seen or consulted any other health care providers outside of the [facility] since your last visit? Include any pap smears or colon screening.  No

## 2018-04-19 NOTE — PROGRESS NOTES
[address] MD Jesus, [address], [place], [place]       DANIELE Ayala PA-C Marylin Arm, [facility]   DANIELE Lopes NP        at [address], [address] [address]     [phone]     FAX: [phone]    at [facility], [address] [place] [address], [address]     [phone]     FAX: [phone]       Patient Care Team:  Raul Hsieh DO as PCP - General (Family Practice)  Blessing Shukla MD (Internal Medicine)  Paul Leyva MD (Endocrinology)      Problem List  Date Reviewed: 1/15/2018          Codes Class Noted    Type 2 diabetes with nephropathy [facility]) ICD-10-CM: E11.21  ICD-9-CM: 250.40, 583.81  4/19/2018        Cir[facility]osis, cryptogenic ([facility] ICD-10-CM: K74.69  ICD-9-CM: 571.5  7/26/2014        Diabetes mellitus ([facility] ICD-10-CM: E11.9  ICD-9-CM: 250.00  7/21/2014        Hypothyroidism ICD-10-CM: E03.9  ICD-9-CM: 244.9  7/21/2014        S/P JAYY (total abdominal hysterectomy) ICD-10-CM: Z90.710  ICD-9-CM: V88.01  7/21/2014        S/P cataract surgery ICD-10-CM: Z98.49  ICD-9-CM: V45.61  7/21/2014        Previous back surgery ICD-10-CM: Z98.890  ICD-9-CM: V45.89  7/21/2014                Ag Jones returns to the [place] [address] for management of cryptogenic cirrhosis. The active problem list, all pertinent past medical history, medications, liver histology, radiologic findings and laboratory findings related to the liver disorder were reviewed with the patient. The patient has not developed ascites. Edema had resolved with low dose of diuretics. The patient has not developed hepatic encephalopathy. Imaging of the liver was performed in 10/2017 with ultrasound. Consistent with cirrhosis. No focal lesion identified. Shear wave elastography was performed in 09/2016.    This suggests cirrhosis and fatty liver. EGD in 9/2015 demonstrated normal esophagus. The EGD was repeated by Dr. Fady Rivera in 11/2017. This demonstrated simple esophageal varices and portal hypertensive gastropathy. The patient underwent a liver biopsy in 09/2014. Dr. Rajendra Vaughn has reviewed the biopsy slides. This demonstrates inactive cirrhosis with only minimal steatosis and no inflammation. The most recent laboratory studies indicate that the liver transaminases are normal, alkaline phosphatase is elevated, tests of hepatic synthetic and metabolic function are normal, and the platelet count is depressed. The patient has no complaints which can be attributed to liver disease. The patient denies fatigue since last visit. The patient completes all daily activities without any functional limitations. The patient has not experienced fevers, chills, shortness of breath, chest pain, pain in the right side over the liver, diffuse abdominal pain, nausea, vomiting, constipation, diarrhrea, dry eyes, dry mouth, arthralgias, myalgias, yellowing of the eyes or skin, itching, dark urine, problems concentrating, swelling of the abdomen, swelling of the lower extremities, hematemesis, or hematochezia. ALLERGIES:  No Known Allergies    MEDICATIONS  Current Outpatient Prescriptions   Medication Sig    amoxicillin (AMOXIL) 875 mg tablet Take 875 mg by mouth two (2) times a day.  chlorpheniramine-HYDROcodone (TUSSIONEX) 10-8 mg/5 mL suspension Take 5 mL by mouth every twelve (12) hours as needed for Cough. Max Daily Amount: 10 mL.  spironolactone (ALDACTONE) 100 mg tablet Take 1 Tab by mouth daily.  furosemide (LASIX) 40 mg tablet Take 1 Tab by mouth daily. take together with the aldactone.  insulin NPH (NOVOLIN N, HUMULIN N) 100 unit/mL injection 35 Units by SubCUTAneous route two (2) times a day.  levothyroxine (SYNTHROID) 100 mcg tablet Take 100 mcg by mouth Daily (before breakfast).  Indications: HYPOTHYROIDISM     No current facility-administered medications for this visit. REVIEW OF SYSTEMS:  Systems related to all organ systems were reviewed. All were negative except as noted above. FAMILY HISTORY:  The father  of CVA. The mother  of cirrhosis. She did not consume alcohol. There are no other family members with liver disease. SOCIAL HISTORY:  The patient is . The patient has 4 children, and 8 grandchildren. The patient has never used tobacco products. The patient has never consumed significant amounts of alcohol. The patient currently works part time for a . PHYSICAL EXAMINATION:  Visit Vitals    /59 (BP 1 Location: Right arm, BP Patient Position: Sitting)    Pulse 60    Temp 99.4 °F (37.4 °C)    Resp 12    Ht 5' 5\" (1.651 m)    Wt 168 lb (76.2 kg)    SpO2 96%    BMI 27.96 kg/m2     General: No acute distress. Eyes: Sclera anicteric. ENT: No oral lesions. Thyroid normal.  Nodes: No adenopathy. Skin: No spider angiomata. No jaundice. No palmar erythema. Respiratory: Lungs clear to auscultation. Cardiovascular: Regular heart rate. No murmurs. No JVD. Abdomen: Soft non-tender. Liver size normal to percussion/palpation. Spleen not palpable. No obvious ascites. Extremities: No edema. No muscle wasting. No gross arthritic changes. Neurologic: Alert and oriented. Cranial nerves grossly intact. No asterixsis.     LABORATORY STUDIES:  Liver [place] of [address] Units 1/15/2018 2017   WBC 4.6 - 13.2 K/uL 5.5 4.4 (L)   ANC 1.8 - 8.0 K/UL 3.3 2.5   HGB 12.0 - 16.0 g/dL 14.1 14.3    - 420 K/uL 104 (L) 106 (L)   INR 0.8 - 1.2   1.1 1.1   AST 15 - 37 U/L 44 (H) 53 (H)   ALT 13 - 56 U/L 43 47   Alk Phos 45 - 117 U/L 175 (H) 181 (H)   Bili, Total 0.2 - 1.0 MG/DL 1.7 (H) 1.4 (H)   Bili, Direct 0.0 - 0.2 MG/DL 0.6 (H) 0.4 (H)   Albumin 3.4 - 5.0 g/dL 3.4 3.4   BUN 7.0 - 18 MG/DL 12 12   Creat 0.6 - 1.3 MG/DL 0.96 0.82   Na 136 - 145 mmol/L 142 145   K 3.5 - 5.5 mmol/L 4.0 4.0   Cl 100 - 108 mmol/L 102 102   CO2 21 - 32 mmol/L 30 31   Glucose 74 - 99 mg/dL 211 (H) 167 (H)     Cancer Screening Latest Ref Rng & Units 1/15/2018 11/21/2017 10/11/2017   AFP, Serum 0.0 - 8.0 ng/mL 5.2 5.4 3.4   AFP-L3% 0.0 - 9.9 % 6.7 6.9 Comment       SEROLOGIES:  Serologies Latest Ref Rng 7/21/2014   Hep A Ab, Total Negative Positive (A)   Hep B Surface Ag Negative Negative   Hep B Core Ab, Total Negative Negative   Ferritin 15 - 150 ng/mL 62   Iron % Saturation 15 - 55 % 29   SONIA, IFA  Negative   ASMCA 0 - 19 Units 8   M2 Ab 0.0 - 20.0 Units 11.4   Ceruloplasmin 16.0 - 45.0 mg/dL 30.3   Alpha-1 antitrypsin level 90 - 200 mg/dL 199     7/2014. Anti-HBsurface negative, anti-HCV negative. LIVER HISTOLOGY:  8/2014. Slides reviewed by MLS. Cirrhosis with minimal steatosis and no inflammation. Etiology most likely BOURNE.  09/2016. Shear wave elastography. E median is 16.77 kPa. This is highly suggestive of cirrhosis. The E Std is 4.76. This wide distrubution of data indicates fatty liver. ENDOSCOPIC PROCEDURES:  9/2014. EGD by MLS. Small esophageal varices. No banding performed. Gastitis. H.Pylori negative. 9/2015. EGD by Dr. Thomas Palmer. Normal esophagus. Repeat in two years. 11/2017. EGD by Dr. Leida Harada. Simple esophageal varices, portal hypertensive gastropathy. 03/2018. EGD by Dr. Thomas Palmer. Small esophageal varices. No banding performed. Repeat in one year. RADIOLOGY:  6/2014. Ultrasound of liver. Echogenic consistent with cirrhosis. No liver mass lesions. No dilated bile ducts. No ascites. Recanalization of umbilical vein. 6/2014. CT scan abdomen with IV contrast.  Changes consistent with cirrhosis. No liver mass lesions. No dilated bile ducts. No ascites. 01/2015. Ultrasound of liver. Diffusely coarsened and echogenic hepatic echotexture. No focal lesion identified. 05/2015. Ultrasound of the liver. Compatible with cirrhosis. No focal hepatic lesion. 05/2015. CT of the liver. Cirrhotic appearing liver. No focal hepatic lesion. Findings in keeping with portal hypertension, no ascites. 11/2015. US of the liver. Small echogenic liver compatible with history of cirrhosis, with no new focal intrahepatic lesion. 09/2016. Ultrasound of the liver. Consistent with cirrhosis. No hepatic masses. 05/2017. Ultrasound of the liver. Nodular hepatic contour with underlying parenchymal stigmata of cirrhosis. No focal hepatic lesion. 10/2017. Ultrasound of the liver. Heterogeneous increase in echogenicity without focal lesion. Possible recanalized umbilical vein. OTHER TESTING:  Not available or performed    ASSESSMENT AND PLAN:  Cryptogenic cirrhosis. This is likely secondary to BOURNE given DM and the finding of minimal steatosis on the liver biopsy. However, she does have positive autoimmune markers and manifests some autoimmune disorders, so this could be of resolving autoimmune etiology. The most recent laboratory studies indicate that the liver transaminases are normal, alkaline phosphatase is elevated, tests of hepatic synthetic and metabolic function are normal, and the platelet count is depressed. Complications of cirrhosis were discussed in detail. We discussed thrombocytopenia, portal hypertension, varices, GI bleeding, peripheral edema, ascites, hepatic encephalopathy, and hepatocellular carcinoma. We discussed the need for follow ups on a regular basis to monitor for complications. We discussed the need for every 6 month liver imaging studies. The patient does not require liver transplant evaluation at this time and may never require this if liver function remains normal.      EGD was performed in 03/2018 by Dr. Moose Colmenares. The patient has small esophageal varices. These were not banded. Repeat in one year.       Hepatic encephalopathy has not developed to date. There is no need for treatment with lactulose and/or xifaxan at this time. The patient has developed minimal edema which was well controlled with low dose diuretics until about 3 months ago. I increased her diuretics to step one at her office visit in 11/2017. She had been on 50 mg aldactone alone. Salt restrictions discussed. Continue step diuretics. The patient was directed to continue all current medications at the current dosages. There are no contraindications for the patient to take any medications that are necessary for treatment of other medical issues. The patient was counseled regarding alcohol consumption. Thrombocytopenia secondary to cirrhosis. There is no evidence of overt bleeding. There is no indication for platelet transfusions or pharmacologic treatment to increase the platelet count. Vaccination for viral hepatitis A is not needed. The patient has serologic evidence of prior exposure or vaccination with immunity. [address] screening has recently been performed and does not suggest [address] Repeat imaging in 05/2018. This was ordered today to be performed with elastography. Elastography  can assess liver fibrosis and determine if a patient has advanced fibrosis or cirrhosis without the need for liver biopsy. 25 minutes total time spent with this patient with more than 50% of this time spent counseling and coordinating care as described above. [facility] 87 in 3 months.     Marta Mendieta NP   [facility] [place] of [address] [place] [address], [address] [place] [address] Sona De La Torre, [facility]  [phone]

## 2018-04-19 NOTE — MR AVS SNAPSHOT
Eugenia Nuñez 
 
 
 [place] [address] 
[phone] Patient: Brendan Odom MRN: [id number] TJN:[id number] Visit Information Date & Time Provider Department Dept. Phone Encounter #  
 4/19/2018  2:00 PM DANIELE Chandler[address] [id number] Follow-up Instructions Return in about 3 months (around 7/19/2018). Upcoming Health Maintenance Date Due HEMOGLOBIN A1C Q6M 1950 LIPID PANEL Q1 1950 FOOT EXAM Q1 12/4/1958 MICROALBUMIN Q1 12/4/1958 EYE EXAM RETINAL OR DILATED Q1 12/4/1958 DTaP/Tdap/Td series (1 - Tdap) 12/4/1969 BREAST CANCER SCRN MAMMOGRAM 12/4/1998 FOBT Q 1 YEAR AGE 50-75 12/4/1998 ZOSTER VACCINE AGE 60> 10/4/2008 GLAUCOMA SCREENING Q2Y 12/4/2013 Bone Densitometry (Dexa) Screening 12/4/2013 Pneumococcal 65+ Low/Medium Risk (1 of 2 - PCV13) 12/4/2013 Influenza Age 5 to Adult 8/1/2017 MEDICARE YEARLY EXAM 3/14/2018 Allergies as of 4/19/2018  Review Complete On: 3/21/2018 By: Bakari Melara No Known Allergies Current Immunizations  Never Reviewed No immunizations on file. Not reviewed this visit You Were Diagnosed With   
  
 Codes Comments Cir[facility]osis, cryptogenic ([facility]    -  Primary ICD-10-CM: T66.25 ICD-9-CM: 571.5 Vitals BP Pulse Temp Resp Height(growth percentile) Weight(growth percentile) 122/59 (BP 1 Location: Right arm, BP Patient Position: Sitting) 60 99.4 °F (37.4 °C) 12 5' 5\" (1.651 m) 168 lb (76.2 kg) SpO2 BMI OB Status Smoking Status 96% 27.96 kg/m2 Hysterectomy Never Smoker BMI and BSA Data Body Mass Index Body Surface Area  
 27.96 kg/m 2 1.87 m 2 Preferred Pharmacy Pharmacy Name Phone [facility] [address] [place] [address] [phone] Your Updated Medication List  
  
   
 This list is accurate as of 4/19/18  2:42 PM.  Always use your most recent med list.  
  
  
  
  
 amoxicillin 875 mg tablet Commonly known as:  AMOXIL Take 875 mg by mouth two (2) times a day. chlorpheniramine-HYDROcodone 10-8 mg/5 mL suspension Commonly known as:  [place] Take 5 mL by mouth every twelve (12) hours as needed for Cough. Max Daily Amount: 10 mL.  
  
 furosemide 40 mg tablet Commonly known as:  LASIX Take 1 Tab by mouth daily. take together with the aldactone. insulin  unit/mL injection Commonly known as:  NOVOLIN N, HUMULIN N  
35 Units by SubCUTAneous route two (2) times a day. levothyroxine 100 mcg tablet Commonly known as:  SYNTHROID Take 100 mcg by mouth Daily (before breakfast). Indications: HYPOTHYROIDISM  
  
 spironolactone 100 mg tablet Commonly known as:  ALDACTONE Take 1 Tab by mouth daily. Prescriptions Printed Refills  
 chlorpheniramine-HYDROcodone (TUSSIONEX) 10-8 mg/5 mL suspension 0 Sig: Take 5 mL by mouth every twelve (12) hours as needed for Cough. Max Daily Amount: 10 mL. Class: Print Route: Oral  
  
Follow-up Instructions Return in about 3 months (around 7/19/2018). To-Do List   
 04/19/2018 Lab:  AFP WITH AFP-L3% 04/19/2018 Lab:  CBC WITH AUTOMATED DIFF   
  
 04/19/2018 Lab:  HEPATIC FUNCTION PANEL   
  
 04/19/2018 Lab:  METABOLIC PANEL, BASIC   
  
 04/19/2018 Lab:  PROTHROMBIN TIME + INR   
  
 04/19/2018 Imaging:  US ABD LTD W ELASTOGRAPHY Introducing [facility]! [facility] introduces [facility] patient portal. Now you can access parts of your medical record, email your doctor's office, and request medication refills online. 1. In your internet browser, go to [email or web address] 2. Click on the First Time User? Click Here link in the Sign In box. You will see the New Member Sign Up page. 3. Enter your [facility] Access Code exactly as it appears below. You will not need to use this code after youve completed the sign-up process. If you do not sign up before the expiration date, you must request a new code. · [facility] Access Code: [email or web address] Expires: 6/19/2018  9:08 AM 
 
4. Enter the last four digits of your Social Security Number (xxxx) and Date of Birth (mm/dd/yyyy) as indicated and click Submit. You will be taken to the next sign-up page. 5. Create a [facility] ID. This will be your [facility] login ID and cannot be changed, so think of one that is secure and easy to remember. 6. Create a [facility] password. You can change your password at any time. 7. Enter your Password Reset Question and Answer. This can be used at a later time if you forget your password. 8. Enter your e-mail address. You will receive e-mail notification when new information is available in [address] 9. Click Sign Up. You can now view and download portions of your medical record. 10. Click the Download Summary menu link to download a portable copy of your medical information. If you have questions, please visit the Frequently Asked Questions section of the [facility] website. Remember, [facility] is NOT to be used for urgent needs. For medical emergencies, dial 911. Now available from your iPhone and Android! Please provide this summary of care documentation to your next provider. Your primary care clinician is listed as Marta Pan. If you have any questions after today's visit, please call [phone].

## 2018-04-20 LAB
AFP L3 MFR SERPL: 9.2 % (ref 0–9.9)
AFP SERPL-MCNC: 4.3 NG/ML (ref 0–8)

## 2018-04-23 ENCOUNTER — TELEPHONE (OUTPATIENT)
Dept: HEMATOLOGY | Age: 68
End: 2018-04-23

## 2018-05-31 ENCOUNTER — HOSPITAL ENCOUNTER (OUTPATIENT)
Dept: ULTRASOUND IMAGING | Age: 68
Discharge: HOME | End: 2018-05-31
Payer: MEDICARE

## 2018-05-31 DIAGNOSIS — K74.69 CIRRHOSIS, CRYPTOGENIC (HCC): ICD-10-CM

## 2018-05-31 PROCEDURE — 0346T US ABD LTD W ELASTOGRAPHY: CPT

## 2018-06-01 NOTE — PROGRESS NOTES
Please let her know that there is nothing suspicious on her ultrasound. No hepatic masses. Consistent with cirrhosis. Thank you.

## 2018-06-28 NOTE — PROGRESS NOTES
Called patient to inform patient of NP Pawel Turpin's findings. Patient didn't answer phone. Voicemail left stating to give a call to [phone] at their earliest convenience.

## 2018-07-23 ENCOUNTER — HOSPITAL ENCOUNTER (OUTPATIENT)
Dept: LAB | Age: 68
Discharge: HOME | End: 2018-07-23
Payer: MEDICARE

## 2018-07-23 ENCOUNTER — OFFICE VISIT (OUTPATIENT)
Dept: HEMATOLOGY | Age: 68
End: 2018-07-23

## 2018-07-23 VITALS
TEMPERATURE: 97.5 F | HEART RATE: 78 BPM | SYSTOLIC BLOOD PRESSURE: 142 MMHG | OXYGEN SATURATION: 98 % | WEIGHT: 174.25 LBS | HEIGHT: 65 IN | DIASTOLIC BLOOD PRESSURE: 56 MMHG | BODY MASS INDEX: 29.03 KG/M2

## 2018-07-23 DIAGNOSIS — K74.60 CIRRHOSIS OF LIVER WITHOUT ASCITES, UNSPECIFIED HEPATIC CIRRHOSIS TYPE (HCC): Primary | ICD-10-CM

## 2018-07-23 DIAGNOSIS — K74.60 CIRRHOSIS OF LIVER WITHOUT ASCITES, UNSPECIFIED HEPATIC CIRRHOSIS TYPE (HCC): ICD-10-CM

## 2018-07-23 LAB
ALBUMIN SERPL-MCNC: 3.1 G/DL (ref 3.4–5)
ALBUMIN/GLOB SERPL: 0.7 {RATIO} (ref 0.8–1.7)
ALP SERPL-CCNC: 179 U/L (ref 45–117)
ALT SERPL-CCNC: 56 U/L (ref 13–56)
ANION GAP SERPL CALC-SCNC: 6 MMOL/L (ref 3–18)
AST SERPL-CCNC: 60 U/L (ref 15–37)
BASOPHILS # BLD: 0 K/UL (ref 0–0.1)
BASOPHILS NFR BLD: 0 % (ref 0–2)
BILIRUB DIRECT SERPL-MCNC: 0.5 MG/DL (ref 0–0.2)
BILIRUB SERPL-MCNC: 1.3 MG/DL (ref 0.2–1)
BUN SERPL-MCNC: 14 MG/DL (ref 7–18)
BUN/CREAT SERPL: 13 (ref 12–20)
CALCIUM SERPL-MCNC: 9.7 MG/DL (ref 8.5–10.1)
CHLORIDE SERPL-SCNC: 98 MMOL/L (ref 100–108)
CO2 SERPL-SCNC: 32 MMOL/L (ref 21–32)
CREAT SERPL-MCNC: 1.1 MG/DL (ref 0.6–1.3)
DIFFERENTIAL METHOD BLD: ABNORMAL
EOSINOPHIL # BLD: 0.2 K/UL (ref 0–0.4)
EOSINOPHIL NFR BLD: 4 % (ref 0–5)
ERYTHROCYTE [DISTWIDTH] IN BLOOD BY AUTOMATED COUNT: 13.2 % (ref 11.6–14.5)
GLOBULIN SER CALC-MCNC: 4.4 G/DL (ref 2–4)
GLUCOSE SERPL-MCNC: 319 MG/DL (ref 74–99)
HCT VFR BLD AUTO: 38.3 % (ref 35–45)
HGB BLD-MCNC: 12.9 G/DL (ref 12–16)
INR PPP: 1.1 (ref 0.8–1.2)
LYMPHOCYTES # BLD: 2 K/UL (ref 0.9–3.6)
LYMPHOCYTES NFR BLD: 37 % (ref 21–52)
MCH RBC QN AUTO: 32.6 PG (ref 24–34)
MCHC RBC AUTO-ENTMCNC: 33.7 G/DL (ref 31–37)
MCV RBC AUTO: 96.7 FL (ref 74–97)
MONOCYTES # BLD: 0.3 K/UL (ref 0.05–1.2)
MONOCYTES NFR BLD: 6 % (ref 3–10)
NEUTS SEG # BLD: 2.9 K/UL (ref 1.8–8)
NEUTS SEG NFR BLD: 53 % (ref 40–73)
PLATELET # BLD AUTO: 99 K/UL (ref 135–420)
PMV BLD AUTO: 11.8 FL (ref 9.2–11.8)
POTASSIUM SERPL-SCNC: 4 MMOL/L (ref 3.5–5.5)
PROT SERPL-MCNC: 7.5 G/DL (ref 6.4–8.2)
PROTHROMBIN TIME: 13.2 SEC (ref 11.5–15.2)
RBC # BLD AUTO: 3.96 M/UL (ref 4.2–5.3)
SODIUM SERPL-SCNC: 136 MMOL/L (ref 136–145)
WBC # BLD AUTO: 5.4 K/UL (ref 4.6–13.2)

## 2018-07-23 PROCEDURE — 85610 PROTHROMBIN TIME: CPT | Performed by: NURSE PRACTITIONER

## 2018-07-23 PROCEDURE — 36415 COLL VENOUS BLD VENIPUNCTURE: CPT | Performed by: NURSE PRACTITIONER

## 2018-07-23 PROCEDURE — 80076 HEPATIC FUNCTION PANEL: CPT | Performed by: NURSE PRACTITIONER

## 2018-07-23 PROCEDURE — 85025 COMPLETE CBC W/AUTO DIFF WBC: CPT | Performed by: NURSE PRACTITIONER

## 2018-07-23 PROCEDURE — 82107 ALPHA-FETOPROTEIN L3: CPT | Performed by: NURSE PRACTITIONER

## 2018-07-23 PROCEDURE — 80048 BASIC METABOLIC PNL TOTAL CA: CPT | Performed by: NURSE PRACTITIONER

## 2018-07-23 NOTE — PROGRESS NOTES
[address] Caron Lee MD, [address], [facility], [facility], NP    Rayo Hough, VIVI Weaver, [facility]   DANIELE Hawley NP        at [address], [address] [facility], [address]     [phone]     FAX: [phone]    at [address], [address]     [phone]     FAX: [phone]       Patient Care Team:  Aidan Diaz NP as PCP - General (Nurse Practitioner)  Sade Sorto MD (Internal Medicine)  Milind Whipple MD (Endocrinology)  Daisha Monsivais MD (Ophthalmology)      Problem List  Date Reviewed: 7/23/2018          Codes Class Noted    Type 2 diabetes with nephropathy [facility]) ICD-10-CM: E11.21  ICD-9-CM: 250.40, 583.81  4/19/2018        Cirrhosis, cryptogenic ([facility] ICD-10-CM: K74.69  ICD-9-CM: 571.5  7/26/2014        Diabetes mellitus ([facility] ICD-10-CM: E11.9  ICD-9-CM: 250.00  7/21/2014        Hypothyroidism ICD-10-CM: E03.9  ICD-9-CM: 244.9  7/21/2014        S/P JAYY (total abdominal hysterectomy) ICD-10-CM: Z90.710  ICD-9-CM: V88.01  7/21/2014        S/P cataract surgery ICD-10-CM: Z98.49  ICD-9-CM: V45.61  7/21/2014        Previous back surgery ICD-10-CM: Z98.890  ICD-9-CM: V45.89  7/21/2014                Richard Rivera returns to the [address] for management of cryptogenic cirrhosis. The active problem list, all pertinent past medical history, medications, liver histology, radiologic findings and laboratory findings related to the liver disorder were reviewed with the patient. The patient has not developed ascites. Edema had resolved with low dose of diuretics. The patient has not developed hepatic encephalopathy. Imaging of the liver was performed in 05/2018 with ultrasound. Consistent with cirrhosis. No focal lesion identified.      Shear wave elastography was performed in 09/2016 and again in 05/2018. This suggests cirrhosis and fatty liver. EGD in 9/2015 demonstrated normal esophagus. The EGD was repeated by Dr. Tyler Liu in 11/2017. This demonstrated simple esophageal varices and portal hypertensive gastropathy. Most recent EGD was performed by Dr. Quincy Horton in 03/2018 and demonstrated small esophageal varices and mild portal hypertensive gastropathy of the body of the stomach. No gastric varices identified. The patient underwent a liver biopsy in 09/2014. Dr. Quincy Horton has reviewed the biopsy slides. This demonstrated inactive cirrhosis with only minimal steatosis and no inflammation. The most recent laboratory studies indicate that the AST is elevated, the ALT is normal, alkaline phosphatase is elevated, tests of hepatic synthetic and metabolic function are normal, and the platelet count is depressed. The patient has no complaints which can be attributed to liver disease. The patient denies fatigue since last visit. The patient completes all daily activities without any functional limitations. The patient has not experienced fevers, chills, shortness of breath, chest pain, pain in the right side over the liver, diffuse abdominal pain, nausea, vomiting, constipation, diarrhrea, dry eyes, dry mouth, arthralgias, myalgias, yellowing of the eyes or skin, itching, dark urine, problems concentrating, swelling of the abdomen, swelling of the lower extremities, hematemesis, or hematochezia. ALLERGIES:  No Known Allergies    MEDICATIONS  Current Outpatient Prescriptions   Medication Sig    spironolactone (ALDACTONE) 100 mg tablet Take 1 Tab by mouth daily.  furosemide (LASIX) 40 mg tablet Take 1 Tab by mouth daily. take together with the aldactone.  insulin NPH (NOVOLIN N, HUMULIN N) 100 unit/mL injection 35 Units by SubCUTAneous route two (2) times a day.     levothyroxine (SYNTHROID) 100 mcg tablet Take 100 mcg by mouth Daily (before breakfast). Indications: HYPOTHYROIDISM     No current facility-administered medications for this visit. REVIEW OF SYSTEMS:  Systems related to all organ systems were reviewed. All were negative except as noted above. FAMILY HISTORY:  The father  of CVA. The mother  of cirrhosis. She did not consume alcohol. There are no other family members with liver disease. SOCIAL HISTORY:  The patient is . The patient has 4 children, and 8 grandchildren. The patient has never used tobacco products. The patient has never consumed significant amounts of alcohol. The patient currently works part time for a . PHYSICAL EXAMINATION:  Visit Vitals    /56    Pulse 78    Temp 97.5 °F (36.4 °C) (Tympanic)    Ht 5' 5\" (1.651 m)    Wt 174 lb 4 oz (79 kg)    SpO2 98%    BMI 29 kg/m2     General: No acute distress. Eyes: Sclera anicteric. ENT: No oral lesions. Thyroid normal.  Nodes: No adenopathy. Skin: No spider angiomata. No jaundice. No palmar erythema. Respiratory: Lungs clear to auscultation. Cardiovascular: Regular heart rate. No murmurs. No JVD. Abdomen: Soft non-tender. Liver size normal to percussion/palpation. Spleen not palpable. No obvious ascites. Extremities: No edema. No muscle wasting. No gross arthritic changes. Neurologic: Alert and oriented. Cranial nerves grossly intact. No asterixsis.     LABORATORY STUDIES:  Liver [place] of [address] [place] [address] 2018   WBC 4.6 - 13.2 K/uL 5.4 7.1   ANC 1.8 - 8.0 K/UL 2.9 4.0   HGB 12.0 - 16.0 g/dL 12.9 13.9    - 420 K/uL 99 (L) 130 (L)   INR 0.8 - 1.2   1.1 1.1   AST 15 - 37 U/L 60 (H) 50 (H)   ALT 13 - 56 U/L 56 40   Alk Phos 45 - 117 U/L 179 (H) 195 (H)   Bili, Total 0.2 - 1.0 MG/DL 1.3 (H) 1.8 (H)   Bili, Direct 0.0 - 0.2 MG/DL 0.5 (H) 0.7 (H)   Albumin 3.4 - 5.0 g/dL 3.1 (L) 3.1 (L)   BUN 7.0 - 18 MG/DL 14 9   Creat 0.6 - 1.3 MG/DL 1.10 1.17   Na 136 - 145 mmol/L 136 139   K 3.5 - 5.5 mmol/L 4.0 3.9   Cl 100 - 108 mmol/L 98 (L) 98 (L)   CO2 21 - 32 mmol/L 32 31   Glucose 74 - 99 mg/dL 319 (H) 420 (HH)     Cancer Screening Latest Ref Rng & Units 7/23/2018 4/19/2018 1/15/2018   AFP, Serum 0.0 - 8.0 ng/mL 5.8 4.3 5.2   AFP-L3% 0.0 - 9.9 % 8.8 9.2 6.7     SEROLOGIES:  Serologies Latest Ref Rng 7/21/2014   Hep A Ab, Total Negative Positive (A)   Hep B Surface Ag Negative Negative   Hep B Core Ab, Total Negative Negative   Ferritin 15 - 150 ng/mL 62   Iron % Saturation 15 - 55 % 29   SONIA, IFA  Negative   ASMCA 0 - 19 Units 8   M2 Ab 0.0 - 20.0 Units 11.4   Ceruloplasmin 16.0 - 45.0 mg/dL 30.3   Alpha-1 antitrypsin level 90 - 200 mg/dL 199     7/2014. Anti-HBsurface negative, anti-HCV negative. LIVER HISTOLOGY:  8/2014. Slides reviewed by MLS. Cirrhosis with minimal steatosis and no inflammation. Etiology most likely BOURNE.  09/2016. Shear wave elastography. E median is 16.77 kPa. This is highly suggestive of cirrhosis. The E Std is 4.76. This wide distrubution of data indicates fatty liver. 05/2018. TRANSIENT HEPATIC ELASTOGRAPHY:   E Range: 9.11-14.61 kPa  E Mean: 11.06 kPa  E Median: 11.54 kPa  E Std: 2.17 kPa     ENDOSCOPIC PROCEDURES:  9/2014. EGD by SALOMÓN. Small esophageal varices. No banding performed. Gastitis. H.Pylori negative. 9/2015. EGD by Dr. Yaz Hanson. Normal esophagus. Repeat in two years. 11/2017. EGD by Dr. Kristel Ng. Simple esophageal varices, portal hypertensive gastropathy. 03/2018. EGD by Dr. Yaz Hanson. Small esophageal varices. No banding performed. Repeat in one year. RADIOLOGY:  6/2014. Ultrasound of liver. Echogenic consistent with cirrhosis. No liver mass lesions. No dilated bile ducts. No ascites. Recanalization of umbilical vein. 6/2014. CT scan abdomen with IV contrast.  Changes consistent with cirrhosis. No liver mass lesions. No dilated bile ducts. No ascites. 01/2015.   Ultrasound of liver.  Diffusely coarsened and echogenic hepatic echotexture. No focal lesion identified. 05/2015. Ultrasound of the liver. Compatible with cirrhosis. No focal hepatic lesion. 05/2015. CT of the liver. Cirrhotic appearing liver. No focal hepatic lesion. Findings in keeping with portal hypertension, no ascites. 11/2015. US of the liver. Small echogenic liver compatible with history of cirrhosis, with no new focal intrahepatic lesion. 09/2016. Ultrasound of the liver. Consistent with cirrhosis. No hepatic masses. 05/2017. Ultrasound of the liver. Nodular hepatic contour with underlying parenchymal stigmata of cirrhosis. No focal hepatic lesion. 10/2017. Ultrasound of the liver. Heterogeneous increase in echogenicity without focal lesion. Possible recanalized umbilical vein. 05/2018. Ultrasound of the liver. Hepatic cirrhosis with recanalized umbilical vein. No focal hepatic mass. OTHER TESTING:  Not available or performed    ASSESSMENT AND PLAN:  Cryptogenic cirrhosis. This is likely secondary to BOURNE given DM and the finding of minimal steatosis on the liver biopsy. However, she does have positive autoimmune markers and manifests some autoimmune disorders, so this could be of resolving autoimmune etiology. The most recent laboratory studies indicate that the liver transaminases are normal, alkaline phosphatase is elevated, tests of hepatic synthetic and metabolic function are normal, and the platelet count is depressed. Complications of cirrhosis were discussed in detail. We discussed thrombocytopenia, portal hypertension, varices, GI bleeding, peripheral edema, ascites, hepatic encephalopathy, and hepatocellular carcinoma. We discussed the need for follow ups on a regular basis to monitor for complications. We discussed the need for every 6 month liver imaging studies.      The patient does not require liver transplant evaluation at this time and may never require this if liver function remains normal.      EGD was performed in 03/2018 by Dr. Eb Dumont. The patient has small esophageal varices. These were not banded. Repeat in one year. Hepatic encephalopathy has not developed to date. There is no need for treatment with lactulose and/or xifaxan at this time. The patient has developed minimal edema which was well controlled with low dose diuretics until about 3 months ago. I increased her diuretics to step one at her office visit in 11/2017. She had been on 50 mg aldactone alone. Salt restrictions discussed. Continue step diuretics. The patient was directed to continue all current medications at the current dosages. There are no contraindications for the patient to take any medications that are necessary for treatment of other medical issues. The patient was counseled regarding alcohol consumption. Thrombocytopenia secondary to cirrhosis. There is no evidence of overt bleeding. There is no indication for platelet transfusions or pharmacologic treatment to increase the platelet count. Vaccination for viral hepatitis A is not needed. The patient has serologic evidence of prior exposure or vaccination with immunity. [facility] [address] screening has recently been performed and does not suggest [address] Repeat imaging in 10/2018. Recent shear wave elastography suggests that the cirrhosis is resolving with weight loss. Will perform shear wave elastography yannually to document hepatic fibrosis. Elastography  can assess liver fibrosis and determine if a patient has advanced fibrosis or cirrhosis without the need for liver biopsy. 25 minutes total time spent with this patient with more than 50% of this time spent counseling and coordinating care as described above. [facility] 87 in 6 months.     Cintia Navarro NP   Liver [place] of [address] [place] [address] [id number] [phone]

## 2018-07-23 NOTE — MR AVS SNAPSHOT
[address] [place] [address] 
[phone] Patient: Fiona Hernandez MRN: [id number] Visit Information Date & Time Provider Department Dept. Phone Encounter #  
 7/23/2018  2:00 PM DANIELE Causey [address] of  [address] [id number] Follow-up Instructions Return in about 6 months (around 1/23/2019). Upcoming Health Maintenance Date Due HEMOGLOBIN A1C Q6M 1950 LIPID PANEL Q1 1950 FOOT EXAM Q1 12/4/1958 MICROALBUMIN Q1 12/4/1958 EYE EXAM RETINAL OR DILATED Q1 12/4/1958 DTaP/Tdap/Td series (1 - Tdap) 12/4/1969 BREAST CANCER SCRN MAMMOGRAM 12/4/1998 FOBT Q 1 YEAR AGE 50-75 12/4/1998 ZOSTER VACCINE AGE 60> 10/4/2008 GLAUCOMA SCREENING Q2Y 12/4/2013 Bone Densitometry (Dexa) Screening 12/4/2013 Pneumococcal 65+ Low/Medium Risk (1 of 2 - PCV13) 12/4/2013 MEDICARE YEARLY EXAM 3/14/2018 Influenza Age 5 to Adult 8/1/2018 Allergies as of 7/23/2018  Review Complete On: 7/23/2018 By: Anca Araujo No Known Allergies Current Immunizations  Never Reviewed No immunizations on file. Not reviewed this visit You Were Diagnosed With   
  
 Codes Comments Cirrhosis of liver without ascites, unspecified hepatic cirrhosis type ([facility]    -  Primary ICD-10-CM: K74.60 ICD-9-CM: 571.5 Vitals BP Pulse Temp Height(growth percentile) Weight(growth percentile) SpO2  
 142/56 78 97.5 °F (36.4 °C) (Tympanic) 5' 5\" (1.651 m) 174 lb 4 oz (79 kg) 98% BMI OB Status Smoking Status 29 kg/m2 Hysterectomy Never Smoker Vitals History BMI and BSA Data Body Mass Index Body Surface Area  
 29 kg/m 2 1.9 m 2 Preferred Pharmacy Pharmacy Name Phone [facility] [address] [place] [address] [phone] Your Updated Medication List  
  
   
 This list is accurate as of 7/23/18  2:45 PM.  Always use your most recent med list.  
  
  
  
  
 furosemide 40 mg tablet Commonly known as:  LASIX Take 1 Tab by mouth daily. take together with the aldactone. insulin  unit/mL injection Commonly known as:  NOVOLIN N, HUMULIN N  
35 Units by SubCUTAneous route two (2) times a day. levothyroxine 100 mcg tablet Commonly known as:  SYNTHROID Take 100 mcg by mouth Daily (before breakfast). Indications: HYPOTHYROIDISM  
  
 spironolactone 100 mg tablet Commonly known as:  ALDACTONE Take 1 Tab by mouth daily. Follow-up Instructions Return in about 6 months (around 1/23/2019). To-Do List   
 07/23/2018 Lab:  AFP WITH AFP-L3%   
  
 07/23/2018 Lab:  CBC WITH AUTOMATED DIFF   
  
 07/23/2018 Lab:  HEPATIC FUNCTION PANEL   
  
 07/23/2018 Lab:  METABOLIC PANEL, BASIC   
  
 07/23/2018 Lab:  PROTHROMBIN TIME + INR Introducing [facility]! Jalen Leon introduces [employer] patient portal. Now you can access parts of your medical record, email your doctor's office, and request medication refills online. 1. In your internet browser, go to [email or web address] 2. Click on the First Time User? Click Here link in the Sign In box. You will see the New Member Sign Up page. 3. Enter your [employer] Access Code exactly as it appears below. You will not need to use this code after youve completed the sign-up process. If you do not sign up before the expiration date, you must request a new code. · [employer] Access Code: [email or web address] Expires: 10/21/2018  2:44 PM 
 
4. Enter the last four digits of your Social Security Number (xxxx) and Date of Birth (mm/dd/yyyy) as indicated and click Submit. You will be taken to the next sign-up page. 5. Create a [employer] ID. This will be your [employer] login ID and cannot be changed, so think of one that is secure and easy to remember. 6. Create a [facility] password. You can change your password at any time. 7. Enter your Password Reset Question and Answer. This can be used at a later time if you forget your password. 8. Enter your e-mail address. You will receive e-mail notification when new information is available in [address] 9. Click Sign Up. You can now view and download portions of your medical record. 10. Click the Download Summary menu link to download a portable copy of your medical information. If you have questions, please visit the Frequently Asked Questions section of the [facility] website. Remember, [facility] is NOT to be used for urgent needs. For medical emergencies, dial 911. Now available from your iPhone and Android! Please provide this summary of care documentation to your next provider. Your primary care clinician is listed as Cheryl Trinidad. If you have any questions after today's visit, please call [phone].

## 2018-07-24 LAB
AFP L3 MFR SERPL: 8.8 % (ref 0–9.9)
AFP SERPL-MCNC: 5.8 NG/ML (ref 0–8)

## 2018-08-23 RX ORDER — SPIRONOLACTONE 100 MG/1
TABLET, FILM COATED ORAL
Qty: 30 TAB | Refills: 4 | Status: SHIPPED | OUTPATIENT
Start: 2018-08-23 | End: 2019-04-25 | Stop reason: DRUGHIGH

## 2018-10-03 ENCOUNTER — OFFICE VISIT (OUTPATIENT)
Dept: HEMATOLOGY | Age: 68
End: 2018-10-03

## 2018-10-03 ENCOUNTER — HOSPITAL ENCOUNTER (OUTPATIENT)
Dept: LAB | Age: 68
Discharge: HOME | End: 2018-10-03
Payer: MEDICARE

## 2018-10-03 VITALS
OXYGEN SATURATION: 98 % | WEIGHT: 178 LBS | RESPIRATION RATE: 16 BRPM | TEMPERATURE: 97.9 F | BODY MASS INDEX: 29.66 KG/M2 | HEIGHT: 65 IN | DIASTOLIC BLOOD PRESSURE: 50 MMHG | HEART RATE: 52 BPM | SYSTOLIC BLOOD PRESSURE: 127 MMHG

## 2018-10-03 DIAGNOSIS — K74.60 CIRRHOSIS OF LIVER WITHOUT ASCITES, UNSPECIFIED HEPATIC CIRRHOSIS TYPE (HCC): Primary | ICD-10-CM

## 2018-10-03 DIAGNOSIS — Z09 ENCOUNTER FOR FOLLOW-UP EXAMINATION AFTER COMPLETED TREATMENT FOR CONDITIONS OTHER THAN MALIGNANT NEOPLASM: ICD-10-CM

## 2018-10-03 DIAGNOSIS — K74.60 CIRRHOSIS OF LIVER WITHOUT ASCITES, UNSPECIFIED HEPATIC CIRRHOSIS TYPE (HCC): ICD-10-CM

## 2018-10-03 LAB
ALBUMIN SERPL-MCNC: 3.3 G/DL (ref 3.4–5)
ALBUMIN/GLOB SERPL: 0.8 {RATIO} (ref 0.8–1.7)
ALP SERPL-CCNC: 158 U/L (ref 45–117)
ALT SERPL-CCNC: 53 U/L (ref 13–56)
ANION GAP SERPL CALC-SCNC: 6 MMOL/L (ref 3–18)
AST SERPL-CCNC: 58 U/L (ref 15–37)
BASOPHILS # BLD: 0.1 K/UL (ref 0–0.1)
BASOPHILS NFR BLD: 1 % (ref 0–2)
BILIRUB DIRECT SERPL-MCNC: 0.4 MG/DL (ref 0–0.2)
BILIRUB SERPL-MCNC: 1.1 MG/DL (ref 0.2–1)
BUN SERPL-MCNC: 19 MG/DL (ref 7–18)
BUN/CREAT SERPL: 18 (ref 12–20)
CALCIUM SERPL-MCNC: 9.9 MG/DL (ref 8.5–10.1)
CHLORIDE SERPL-SCNC: 98 MMOL/L (ref 100–108)
CO2 SERPL-SCNC: 30 MMOL/L (ref 21–32)
CREAT SERPL-MCNC: 1.06 MG/DL (ref 0.6–1.3)
DIFFERENTIAL METHOD BLD: ABNORMAL
EOSINOPHIL # BLD: 0.3 K/UL (ref 0–0.4)
EOSINOPHIL NFR BLD: 4 % (ref 0–5)
ERYTHROCYTE [DISTWIDTH] IN BLOOD BY AUTOMATED COUNT: 13.4 % (ref 11.6–14.5)
GLOBULIN SER CALC-MCNC: 4.3 G/DL (ref 2–4)
GLUCOSE SERPL-MCNC: 287 MG/DL (ref 74–99)
HCT VFR BLD AUTO: 39.6 % (ref 35–45)
HGB BLD-MCNC: 13.5 G/DL (ref 12–16)
INR PPP: 1.1 (ref 0.8–1.2)
LYMPHOCYTES # BLD: 2.5 K/UL (ref 0.9–3.6)
LYMPHOCYTES NFR BLD: 32 % (ref 21–52)
MCH RBC QN AUTO: 33.1 PG (ref 24–34)
MCHC RBC AUTO-ENTMCNC: 34.1 G/DL (ref 31–37)
MCV RBC AUTO: 97.1 FL (ref 74–97)
MONOCYTES # BLD: 0.6 K/UL (ref 0.05–1.2)
MONOCYTES NFR BLD: 8 % (ref 3–10)
NEUTS SEG # BLD: 4.2 K/UL (ref 1.8–8)
NEUTS SEG NFR BLD: 55 % (ref 40–73)
PLATELET # BLD AUTO: 120 K/UL (ref 135–420)
PMV BLD AUTO: 12 FL (ref 9.2–11.8)
POTASSIUM SERPL-SCNC: 4.3 MMOL/L (ref 3.5–5.5)
PROT SERPL-MCNC: 7.6 G/DL (ref 6.4–8.2)
PROTHROMBIN TIME: 13.4 SEC (ref 11.5–15.2)
RBC # BLD AUTO: 4.08 M/UL (ref 4.2–5.3)
SODIUM SERPL-SCNC: 134 MMOL/L (ref 136–145)
WBC # BLD AUTO: 7.7 K/UL (ref 4.6–13.2)

## 2018-10-03 PROCEDURE — 82107 ALPHA-FETOPROTEIN L3: CPT | Performed by: NURSE PRACTITIONER

## 2018-10-03 PROCEDURE — 85610 PROTHROMBIN TIME: CPT | Performed by: NURSE PRACTITIONER

## 2018-10-03 PROCEDURE — 80048 BASIC METABOLIC PNL TOTAL CA: CPT | Performed by: NURSE PRACTITIONER

## 2018-10-03 PROCEDURE — 80076 HEPATIC FUNCTION PANEL: CPT | Performed by: NURSE PRACTITIONER

## 2018-10-03 PROCEDURE — 36415 COLL VENOUS BLD VENIPUNCTURE: CPT | Performed by: NURSE PRACTITIONER

## 2018-10-03 PROCEDURE — 85025 COMPLETE CBC W/AUTO DIFF WBC: CPT | Performed by: NURSE PRACTITIONER

## 2018-10-03 PROCEDURE — 82378 CARCINOEMBRYONIC ANTIGEN: CPT | Performed by: NURSE PRACTITIONER

## 2018-10-03 RX ORDER — LOSARTAN POTASSIUM 25 MG/1
TABLET ORAL DAILY
COMMUNITY
End: 2021-02-10 | Stop reason: ALTCHOICE

## 2018-10-03 NOTE — PROGRESS NOTES
[address] Caron Lee MD, [address], [place], [place]       DANIELE Mai, VIVI Ascencio, [facility]   DANIELE Mehta NP        at [address], [address] [address]     [phone]     FAX: [phone]    at [address], [address]     [phone]     FAX: [phone]       Patient Care Team:  Melany Dillon NP as PCP - General (Nurse Practitioner)  Renay Salinas MD (Internal Medicine)  Finesse Santacruz MD (Endocrinology)  Kendal Ceballos MD (Ophthalmology)      Problem List  Date Reviewed: 7/23/2018          Codes Class Noted    Type 2 diabetes with nephropathy [facility]) ICD-10-CM: E11.21  ICD-9-CM: 250.40, 583.81  4/19/2018        Cirrhosis, cryptogenic ([facility] ICD-10-CM: K74.69  ICD-9-CM: 571.5  7/26/2014        Diabetes mellitus ([facility] ICD-10-CM: E11.9  ICD-9-CM: 250.00  7/21/2014        Hypothyroidism ICD-10-CM: E03.9  ICD-9-CM: 244.9  7/21/2014        S/P JAYY (total abdominal hysterectomy) ICD-10-CM: Z90.710  ICD-9-CM: V88.01  7/21/2014        S/P cataract surgery ICD-10-CM: Z98.49  ICD-9-CM: V45.61  7/21/2014        Previous back surgery ICD-10-CM: Z98.890  ICD-9-CM: V45.89  7/21/2014                Janae Caldwell returns to the [address] for management of cryptogenic cirrhosis. The active problem list, all pertinent past medical history, medications, liver histology, radiologic findings and laboratory findings related to the liver disorder were reviewed with the patient. The patient has not developed ascites. Mass behind left retina. Has been seen at [address] regarding this. Macular degeneration in right eye. Edema had resolved with step one diuretics, 40 mg lasix and 100 mg aldactone/day. The patient has not developed hepatic encephalopathy.     Imaging of the liver was performed in 09/2018 with MRI/MRCP. Consistent with cirrhosis. No focal lesion identified. Shear wave elastography was performed in 09/2016 and again in 05/2018. This suggests cirrhosis and fatty liver. EGD in 9/2015 demonstrated normal esophagus. The EGD was repeated by Dr. Wendi Johnson in 11/2017. This demonstrated simple esophageal varices and portal hypertensive gastropathy. Most recent EGD was performed by Dr. Edy Barajas in 03/2018 and demonstrated small esophageal varices and mild portal hypertensive gastropathy of the body of the stomach. No gastric varices identified. The patient underwent a liver biopsy in 09/2014. Dr. Edy Barajas has reviewed the biopsy slides. This demonstrated inactive cirrhosis with only minimal steatosis and no inflammation. The most recent laboratory studies indicate that the AST is elevated, the ALT is normal, alkaline phosphatase is elevated, tests of hepatic synthetic and metabolic function are normal, and the platelet count is depressed. The patient has no complaints which can be attributed to liver disease. The patient denies fatigue since last visit. The patient completes all daily activities without any functional limitations. The patient has not experienced fevers, chills, shortness of breath, chest pain, pain in the right side over the liver, diffuse abdominal pain, nausea, vomiting, constipation, diarrhrea, dry eyes, dry mouth, arthralgias, myalgias, yellowing of the eyes or skin, itching, dark urine, problems concentrating, swelling of the abdomen, swelling of the lower extremities, hematemesis, or hematochezia. ALLERGIES:  No Known Allergies    MEDICATIONS  Current Outpatient Prescriptions   Medication Sig    losartan (COZAAR) 25 mg tablet Take  by mouth daily. Take a half tablet once a day.  spironolactone (ALDACTONE) 100 mg tablet TAKE ONE TABLET BY MOUTH ONCE DAILY    furosemide (LASIX) 40 mg tablet Take 1 Tab by mouth daily. take together with the aldactone.  insulin NPH (NOVOLIN N, HUMULIN N) 100 unit/mL injection 35 Units by SubCUTAneous route two (2) times a day.  levothyroxine (SYNTHROID) 100 mcg tablet Take 100 mcg by mouth Daily (before breakfast). Indications: HYPOTHYROIDISM     No current facility-administered medications for this visit. REVIEW OF SYSTEMS:  Systems related to all organ systems were reviewed. All were negative except as noted above. FAMILY HISTORY:  The father  of CVA. The mother  of cirrhosis. She did not consume alcohol. There are no other family members with liver disease. SOCIAL HISTORY:  The patient is . The patient has 4 children, and 8 grandchildren. The patient has never used tobacco products. The patient has never consumed significant amounts of alcohol. The patient currently works part time for a . PHYSICAL EXAMINATION:  Visit Vitals    /50 (BP 1 Location: Left arm, BP Patient Position: Sitting)    Pulse (!) 52    Temp 97.9 °F (36.6 °C) (Tympanic)    Resp 16    Ht 5' 5\" (1.651 m)    Wt 178 lb (80.7 kg)    SpO2 98%    BMI 29.62 kg/m2     General: No acute distress. Eyes: Sclera anicteric. ENT: No oral lesions. Thyroid normal.  Nodes: No adenopathy. Skin: No spider angiomata. No jaundice. No palmar erythema. Respiratory: Lungs clear to auscultation. Cardiovascular: Regular heart rate. No murmurs. No JVD. Abdomen: Soft non-tender. Liver size normal to percussion/palpation. Spleen not palpable. No obvious ascites. Extremities: No edema. No muscle wasting. No gross arthritic changes. Neurologic: Alert and oriented. Cranial nerves grossly intact. No asterixsis.     LABORATORY STUDIES:  [facility] [place] of [address] [place] [address] Units 10/3/2018 2018   WBC 4.6 - 13.2 K/uL 7.7 5.4   ANC 1.8 - 8.0 K/UL 4.2 2.9   HGB 12.0 - 16.0 g/dL 13.5 12.9    - 420 K/uL 120 (L) 99 (L)   INR 0.8 - 1.2   1.1 1.1 AST 15 - 37 U/L 58 (H) 60 (H)   ALT 13 - 56 U/L 53 56   Alk Phos 45 - 117 U/L 158 (H) 179 (H)   Bili, Total 0.2 - 1.0 MG/DL 1.1 (H) 1.3 (H)   Bili, Direct 0.0 - 0.2 MG/DL 0.4 (H) 0.5 (H)   Albumin 3.4 - 5.0 g/dL 3.3 (L) 3.1 (L)   BUN 7.0 - 18 MG/DL 19 (H) 14   Creat 0.6 - 1.3 MG/DL 1.06 1.10   Na 136 - 145 mmol/L 134 (L) 136   K 3.5 - 5.5 mmol/L 4.3 4.0   Cl 100 - 108 mmol/L 98 (L) 98 (L)   CO2 21 - 32 mmol/L 30 32   Glucose 74 - 99 mg/dL 287 (H) 319 (H)     L[facility] [place] of [address] [place] [address] Ref Rng & Units 4/19/2018 1/15/2018   WBC 4.6 - 13.2 K/uL 7.1 5.5   ANC 1.8 - 8.0 K/UL 4.0 3.3   HGB 12.0 - 16.0 g/dL 13.9 14.1    - 420 K/uL 130 (L) 104 (L)   INR 0.8 - 1.2   1.1 1.1   AST 15 - 37 U/L 50 (H) 44 (H)   ALT 13 - 56 U/L 40 43   Alk Phos 45 - 117 U/L 195 (H) 175 (H)   Bili, Total 0.2 - 1.0 MG/DL 1.8 (H) 1.7 (H)   Bili, Direct 0.0 - 0.2 MG/DL 0.7 (H) 0.6 (H)   Albumin 3.4 - 5.0 g/dL 3.1 (L) 3.4   BUN 7.0 - 18 MG/DL 9 12   Creat 0.6 - 1.3 MG/DL 1.17 0.96   Na 136 - 145 mmol/L 139 142   K 3.5 - 5.5 mmol/L 3.9 4.0   Cl 100 - 108 mmol/L 98 (L) 102   CO2 21 - 32 mmol/L 31 30   Glucose 74 - 99 mg/dL 420 (HH) 211 (H)       Cancer Screening Latest Ref Rng & Units 7/23/2018 4/19/2018 1/15/2018   AFP, Serum 0.0 - 8.0 ng/mL 5.8 4.3 5.2   AFP-L3% 0.0 - 9.9 % 8.8 9.2 6.7     SEROLOGIES:  Serologies Latest Ref Rng 7/21/2014   Hep A Ab, Total Negative Positive (A)   Hep B Surface Ag Negative Negative   Hep B Core Ab, Total Negative Negative   Ferritin 15 - 150 ng/mL 62   Iron % Saturation 15 - 55 % 29   SONIA, IFA  Negative   ASMCA 0 - 19 Units 8   M2 Ab 0.0 - 20.0 Units 11.4   Ceruloplasmin 16.0 - 45.0 mg/dL 30.3   Alpha-1 antitrypsin level 90 - 200 mg/dL 199     7/2014. Anti-HBsurface negative, anti-HCV negative. LIVER HISTOLOGY:  8/2014. Slides reviewed by MLS. Cirrhosis with minimal steatosis and no inflammation. Etiology most likely BOURNE.  09/2016. Shear wave elastography. E median is 16.77 kPa.   This is highly suggestive of cirrhosis. The E Std is 4.76. This wide distrubution of data indicates fatty liver. 05/2018. TRANSIENT HEPATIC ELASTOGRAPHY:   E Range: 9.11-14.61 kPa  E Mean: 11.06 kPa  E Median: 11.54 kPa  E Std: 2.17 kPa     ENDOSCOPIC PROCEDURES:  9/2014. EGD by MLS. Small esophageal varices. No banding performed. Gastitis. H.Pylori negative. 9/2015. EGD by Dr. Teresa Blum. Normal esophagus. Repeat in two years. 11/2017. EGD by Dr. Bayron Pacheco. Simple esophageal varices, portal hypertensive gastropathy. 03/2018. EGD by Dr. Teresa Blum. Small esophageal varices. No banding performed. Repeat in one year. RADIOLOGY:  6/2014. Ultrasound of liver. Echogenic consistent with cirrhosis. No liver mass lesions. No dilated bile ducts. No ascites. Recanalization of umbilical vein. 6/2014. CT scan abdomen with IV contrast.  Changes consistent with cirrhosis. No liver mass lesions. No dilated bile ducts. No ascites. 01/2015. Ultrasound of liver. Diffusely coarsened and echogenic hepatic echotexture. No focal lesion identified. 05/2015. Ultrasound of the liver. Compatible with cirrhosis. No focal hepatic lesion. 05/2015. CT of the liver. Cirrhotic appearing liver. No focal hepatic lesion. Findings in keeping with portal hypertension, no ascites. 11/2015. US of the liver. Small echogenic liver compatible with history of cirrhosis, with no new focal intrahepatic lesion. 09/2016. Ultrasound of the liver. Consistent with cirrhosis. No hepatic masses. 05/2017. Ultrasound of the liver. Nodular hepatic contour with underlying parenchymal stigmata of cirrhosis. No focal hepatic lesion. 10/2017. Ultrasound of the liver. Heterogeneous increase in echogenicity without focal lesion. Possible recanalized umbilical vein. 05/2018. Ultrasound of the liver. Hepatic cirrhosis with recanalized umbilical vein. No focal hepatic mass. 06/2018.   CT Abd/Pel w/contrast. Cirrhosis with sequela of portal hypertension. This is manifested by splenomegaly, recannulized paraumbilical vein and gastroesophageal varices. 08/2018. CT Abd/Pel w/IV contrast only. Cirrhosis of the liver with portal venous hypertension. 09/2018. MRI/MRCP Abdomen w/wo contrast.  Abnormal hepatic morphology suggesting parenchymal disease/cirrhosis. No discrete hepatic mass is identified. Intra-abdominal varicosities are present. There is moderate splenic enlargement. Small cystic pancreatic masses are noted. OTHER TESTING:  Not available or performed    ASSESSMENT AND PLAN:  Cryptogenic cirrhosis. This is likely secondary to BOURNE given DM and the finding of minimal steatosis on the liver biopsy. However, she does have positive autoimmune markers and manifests some autoimmune disorders, so this could be of resolving autoimmune etiology. The most recent laboratory studies indicate that the liver transaminases are normal, alkaline phosphatase is elevated, tests of hepatic synthetic and metabolic function are normal, and the platelet count is depressed. Complications of cirrhosis were discussed in detail. We discussed thrombocytopenia, portal hypertension, varices, GI bleeding, peripheral edema, ascites, hepatic encephalopathy, and hepatocellular carcinoma. We discussed the need for follow ups on a regular basis to monitor for complications. We discussed the need for every 6 month liver imaging studies. The patient does not require liver transplant evaluation at this time and may never require this if liver function remains normal.      EGD was performed in 03/2018 by Dr. King Collet. The patient has small esophageal varices. These were not banded. Repeat in one year. Hepatic encephalopathy has not developed to date. There is no need for treatment with lactulose and/or xifaxan at this time.     The patient has developed minimal edema which was well controlled with low dose diuretics until about 3 months ago. I increased her diuretics to step one at her office visit in 11/2017. She had been on 50 mg aldactone alone. Salt restrictions discussed. Continue step diuretics. The patient was directed to continue all current medications at the current dosages. There are no contraindications for the patient to take any medications that are necessary for treatment of other medical issues. The patient was counseled regarding alcohol consumption. Thrombocytopenia secondary to cirrhosis. There is no evidence of overt bleeding. There is no indication for platelet transfusions or pharmacologic treatment to increase the platelet count. Vaccination for viral hepatitis A is not needed. The patient has serologic evidence of prior exposure or vaccination with immunity. [address] screening has recently been performed and does not suggest [address] Repeat imaging in 03/2019. Recent shear wave elastography suggests that the cirrhosis is resolving with weight loss. Will perform shear wave elastography annually to document hepatic fibrosis. Elastography  can assess liver fibrosis and determine if a patient has advanced fibrosis or cirrhosis without the need for liver biopsy. 25 minutes total time spent with this patient with more than 50% of this time spent counseling and coordinating care as described above. Follow-up [facility] [address] as scheduled in 01/2019.      Jacqui Cooper NP   [facility]iver [place] of [address] [place] [address], [address] [address] [place], [facility]  [phone]

## 2018-10-04 LAB — CEA SERPL-MCNC: 3.5 NG/ML

## 2018-10-06 RX ORDER — FUROSEMIDE 40 MG/1
TABLET ORAL
Qty: 30 TAB | Refills: 4 | Status: SHIPPED | OUTPATIENT
Start: 2018-10-06 | End: 2018-10-22 | Stop reason: SDUPTHER

## 2018-10-08 LAB
AFP L3 MFR SERPL: 8.7 % (ref 0–9.9)
AFP SERPL-MCNC: 5.3 NG/ML (ref 0–8)

## 2018-10-08 NOTE — PROGRESS NOTES
Please let Ms. Sandoval Louise know that her labs are unchanged. Platelet count is up to 120,000. Tumor markers are negative. Thank you.

## 2018-10-24 ENCOUNTER — TELEPHONE (OUTPATIENT)
Dept: HEMATOLOGY | Age: 68
End: 2018-10-24

## 2018-10-26 RX ORDER — FUROSEMIDE 40 MG/1
TABLET ORAL
Qty: 30 TAB | Refills: 4 | Status: SHIPPED | OUTPATIENT
Start: 2018-10-26 | End: 2019-04-25 | Stop reason: DRUGHIGH

## 2019-04-25 ENCOUNTER — HOSPITAL ENCOUNTER (OUTPATIENT)
Dept: LAB | Age: 69
Discharge: HOME | End: 2019-04-25
Payer: MEDICARE

## 2019-04-25 ENCOUNTER — OFFICE VISIT (OUTPATIENT)
Dept: HEMATOLOGY | Age: 69
End: 2019-04-25

## 2019-04-25 VITALS
WEIGHT: 188 LBS | HEART RATE: 62 BPM | TEMPERATURE: 98.4 F | SYSTOLIC BLOOD PRESSURE: 130 MMHG | BODY MASS INDEX: 31.32 KG/M2 | OXYGEN SATURATION: 98 % | HEIGHT: 65 IN | DIASTOLIC BLOOD PRESSURE: 63 MMHG

## 2019-04-25 DIAGNOSIS — K74.60 CIRRHOSIS OF LIVER WITHOUT ASCITES, UNSPECIFIED HEPATIC CIRRHOSIS TYPE (HCC): Primary | ICD-10-CM

## 2019-04-25 DIAGNOSIS — K74.60 CIRRHOSIS OF LIVER WITHOUT ASCITES, UNSPECIFIED HEPATIC CIRRHOSIS TYPE (HCC): ICD-10-CM

## 2019-04-25 LAB
ALBUMIN SERPL-MCNC: 3.1 G/DL (ref 3.4–5)
ALBUMIN/GLOB SERPL: 0.8 {RATIO} (ref 0.8–1.7)
ALP SERPL-CCNC: 140 U/L (ref 45–117)
ALT SERPL-CCNC: 43 U/L (ref 13–56)
ANION GAP SERPL CALC-SCNC: 9 MMOL/L (ref 3–18)
AST SERPL-CCNC: 53 U/L (ref 15–37)
BASOPHILS # BLD: 0 K/UL (ref 0–0.1)
BASOPHILS NFR BLD: 1 % (ref 0–2)
BILIRUB DIRECT SERPL-MCNC: 0.6 MG/DL (ref 0–0.2)
BILIRUB SERPL-MCNC: 1.8 MG/DL (ref 0.2–1)
BUN SERPL-MCNC: 16 MG/DL (ref 7–18)
BUN/CREAT SERPL: 16 (ref 12–20)
CALCIUM SERPL-MCNC: 9.9 MG/DL (ref 8.5–10.1)
CHLORIDE SERPL-SCNC: 100 MMOL/L (ref 100–108)
CO2 SERPL-SCNC: 30 MMOL/L (ref 21–32)
CREAT SERPL-MCNC: 1.03 MG/DL (ref 0.6–1.3)
DIFFERENTIAL METHOD BLD: ABNORMAL
EOSINOPHIL # BLD: 0.2 K/UL (ref 0–0.4)
EOSINOPHIL NFR BLD: 3 % (ref 0–5)
ERYTHROCYTE [DISTWIDTH] IN BLOOD BY AUTOMATED COUNT: 13.5 % (ref 11.6–14.5)
GLOBULIN SER CALC-MCNC: 3.8 G/DL (ref 2–4)
GLUCOSE SERPL-MCNC: 163 MG/DL (ref 74–99)
HCT VFR BLD AUTO: 40.8 % (ref 35–45)
HGB BLD-MCNC: 14 G/DL (ref 12–16)
INR PPP: 1.1 (ref 0.8–1.2)
LYMPHOCYTES # BLD: 2 K/UL (ref 0.9–3.6)
LYMPHOCYTES NFR BLD: 31 % (ref 21–52)
MCH RBC QN AUTO: 32.9 PG (ref 24–34)
MCHC RBC AUTO-ENTMCNC: 34.3 G/DL (ref 31–37)
MCV RBC AUTO: 96 FL (ref 74–97)
MONOCYTES # BLD: 0.6 K/UL (ref 0.05–1.2)
MONOCYTES NFR BLD: 8 % (ref 3–10)
NEUTS SEG # BLD: 3.7 K/UL (ref 1.8–8)
NEUTS SEG NFR BLD: 57 % (ref 40–73)
PLATELET # BLD AUTO: 124 K/UL (ref 135–420)
PMV BLD AUTO: 11.3 FL (ref 9.2–11.8)
POTASSIUM SERPL-SCNC: 3.9 MMOL/L (ref 3.5–5.5)
PROT SERPL-MCNC: 6.9 G/DL (ref 6.4–8.2)
PROTHROMBIN TIME: 13.5 SEC (ref 11.5–15.2)
RBC # BLD AUTO: 4.25 M/UL (ref 4.2–5.3)
SODIUM SERPL-SCNC: 139 MMOL/L (ref 136–145)
WBC # BLD AUTO: 6.6 K/UL (ref 4.6–13.2)

## 2019-04-25 PROCEDURE — 36415 COLL VENOUS BLD VENIPUNCTURE: CPT

## 2019-04-25 PROCEDURE — 80076 HEPATIC FUNCTION PANEL: CPT

## 2019-04-25 PROCEDURE — 85610 PROTHROMBIN TIME: CPT

## 2019-04-25 PROCEDURE — 85025 COMPLETE CBC W/AUTO DIFF WBC: CPT

## 2019-04-25 PROCEDURE — 82107 ALPHA-FETOPROTEIN L3: CPT

## 2019-04-25 PROCEDURE — 80048 BASIC METABOLIC PNL TOTAL CA: CPT

## 2019-04-25 RX ORDER — FUROSEMIDE 40 MG/1
60 TABLET ORAL DAILY
Qty: 135 TAB | Refills: 3 | Status: SHIPPED | OUTPATIENT
Start: 2019-04-25 | End: 2020-08-17 | Stop reason: SDUPTHER

## 2019-04-25 RX ORDER — SPIRONOLACTONE 100 MG/1
TABLET, FILM COATED ORAL
Qty: 90 TAB | Refills: 3 | Status: SHIPPED | OUTPATIENT
Start: 2019-04-25 | End: 2020-08-17 | Stop reason: SDUPTHER

## 2019-04-25 RX ORDER — SPIRONOLACTONE 50 MG/1
TABLET, FILM COATED ORAL
Qty: 90 TAB | Refills: 3 | Status: SHIPPED | OUTPATIENT
Start: 2019-04-25 | End: 2020-11-10 | Stop reason: DRUGHIGH

## 2019-04-25 NOTE — PROGRESS NOTES
[address] MD, FACP, [place], [place]       VIVI Elliott ACNP-BC   DANIELE Armas NP        at [address], [address] [place] [address], [place]  [address].     [phone]     FAX: [phone]    at [address], [address]     [phone]     FAX: [phone]       Patient Care Team:  Venkatesh Wright NP as PCP - General (Nurse Practitioner)  Pink Brunner, MD (Internal Medicine)  Gil Cavanaugh MD (Ophthalmology)  Rachele Arredondo MD as Consulting Provider      Problem List  Date Reviewed: 4/25/2019          Codes Class Noted    Type 2 diabetes with nephropathy [facility]) ICD-10-CM: E11.21  ICD-9-CM: 250.40, 583.81  4/19/2018        Cirrhosis, cryptogenic ([facility] ICD-10-CM: K74.69  ICD-9-CM: 571.5  7/26/2014        Diabetes mellitus ([facility] ICD-10-CM: E11.9  ICD-9-CM: 250.00  7/21/2014        Hypothyroidism ICD-10-CM: E03.9  ICD-9-CM: 244.9  7/21/2014        S/P JAYY (total abdominal hysterectomy) ICD-10-CM: Z90.710  ICD-9-CM: V88.01  7/21/2014        S/P cataract surgery ICD-10-CM: Z98.49  ICD-9-CM: V45.61  7/21/2014        Previous back surgery ICD-10-CM: Z98.890  ICD-9-CM: V45.89  7/21/2014                Maren Coley [facility] of cryptogenic cirrhosis. The active problem list, all pertinent past medical history, medications, liver histology, radiologic findings and laboratory findings related to the liver disorder were reviewed with the patient. The patient has not developed ascites. Mass behind left retina. Has been seen at [facility] regarding this. Macular degeneration in right eye. Edema had resolved with step one diuretics, 40 mg lasix and 100 mg aldactone/day. The patient has not developed hepatic encephalopathy.     Imaging of the liver was performed in 09/2018 with MRI/MRCP. Consistent with cirrhosis. No focal lesion identified. Shear wave elastography was performed in 09/2016 and again in 05/2018. This suggests cirrhosis and fatty liver. EGD in 9/2015 demonstrated normal esophagus. The EGD was repeated by Dr. Jacques Guzman in 11/2017. This demonstrated simple esophageal varices and portal hypertensive gastropathy. Most recent EGD was performed by Dr. Pamela Harper in 03/2018 and demonstrated small esophageal varices and mild portal hypertensive gastropathy of the body of the stomach. No gastric varices identified. The patient underwent a liver biopsy in 09/2014. Dr. Pamela Harper has reviewed the biopsy slides. This demonstrated inactive cirrhosis with only minimal steatosis and no inflammation. The most recent laboratory studies indicate that the AST is elevated, the ALT is normal, alkaline phosphatase is elevated, tests of hepatic synthetic and metabolic function are normal, and the platelet count is depressed. The patient has no complaints which can be attributed to liver disease. The patient denies fatigue since last visit. The patient completes all daily activities without any functional limitations. The patient has not experienced fevers, chills, shortness of breath, chest pain, pain in the right side over the liver, diffuse abdominal pain, nausea, vomiting, constipation, diarrhrea, dry eyes, dry mouth, arthralgias, myalgias, yellowing of the eyes or skin, itching, dark urine, problems concentrating, swelling of the abdomen, swelling of the lower extremities, hematemesis, or hematochezia. ALLERGIES:  No Known Allergies    MEDICATIONS  Current Outpatient Medications   Medication Sig    furosemide (LASIX) 40 mg tablet TAKE ONE TABLET BY MOUTH ONCE DAILY TAKE TOGETHER WITH ALDACTONE    losartan (COZAAR) 25 mg tablet Take  by mouth daily. Take a half tablet once a day.     spironolactone (ALDACTONE) 100 mg tablet TAKE ONE TABLET BY MOUTH ONCE DAILY    insulin NPH (NOVOLIN N, HUMULIN N) 100 unit/mL injection 35 Units by SubCUTAneous route two (2) times a day.  levothyroxine (SYNTHROID) 100 mcg tablet Take 100 mcg by mouth Daily (before breakfast). Indications: HYPOTHYROIDISM     No current facility-administered medications for this visit. REVIEW OF SYSTEMS:  Systems related to all organ systems were reviewed. All were negative except as noted above. FAMILY HISTORY:  The father  of CVA. The mother  of cirrhosis. She did not consume alcohol. There are no other family members with liver disease. SOCIAL HISTORY:  The patient is . The patient has 4 children, and 8 grandchildren. The patient has never used tobacco products. The patient has never consumed significant amounts of alcohol. The patient currently works part time for a . PHYSICAL EXAMINATION:  Visit Vitals  /63 (BP 1 Location: Right arm, BP Patient Position: Sitting)   Pulse 62   Temp 98.4 °F (36.9 °C)   Ht 5' 5\" (1.651 m)   Wt 188 lb (85.3 kg)   SpO2 98%   BMI 31.28 kg/m²     General: No acute distress. Eyes: Sclera anicteric. ENT: No oral lesions. Thyroid normal.  Nodes: No adenopathy. Skin: No spider angiomata. No jaundice. No palmar erythema. Respiratory: Lungs clear to auscultation. Cardiovascular: Regular heart rate. No murmurs. No JVD. Abdomen: Soft non-tender. Liver size normal to percussion/palpation. Spleen not palpable. Belly is firm. Extremities: Trace lower extremity edema. No muscle wasting. No gross arthritic changes. Neurologic: Alert and oriented. Cranial nerves grossly intact. No asterixsis.     LABORATORY STUDIES:  Liver [place] of [address] Units 2019 10/3/2018   WBC 4.6 - 13.2 K/uL 6.6 7.7   ANC 1.8 - 8.0 K/UL 3.7 4.2   HGB 12.0 - 16.0 g/dL 14.0 13.5    - 420 K/uL 124 (L) 120 (L)   INR 0.8 - 1.2   1.1 1.1   AST 15 - 37 U/L 53 (H) 58 (H)   ALT 13 - 56 U/L 43 53   Alk Phos 45 - 117 U/L 140 (H) 158 (H)   Bili, Total 0.2 - 1.0 MG/DL 1.8 (H) 1.1 (H)   Bili, Direct 0.0 - 0.2 MG/DL 0.6 (H) 0.4 (H)   Albumin 3.4 - 5.0 g/dL 3.1 (L) 3.3 (L)   BUN 7.0 - 18 MG/DL 16 19 (H)   Creat 0.6 - 1.3 MG/DL 1.03 1.06   Na 136 - 145 mmol/L 139 134 (L)   K 3.5 - 5.5 mmol/L 3.9 4.3   Cl 100 - 108 mmol/L 100 98 (L)   CO2 21 - 32 mmol/L 30 30   Glucose 74 - 99 mg/dL 163 (H) 287 (H)     Cancer Screening Latest Ref Rng & Units 4/25/2019 10/3/2018 7/23/2018   AFP, Serum 0.0 - 8.0 ng/mL 5.9 5.3 5.8   AFP-L3% 0.0 - 9.9 % 7.1 8.7 8.8   CEA ng/mL  3.5      SEROLOGIES:  Serologies Latest Ref Rng 7/21/2014   Hep A Ab, Total Negative Positive (A)   Hep B Surface Ag Negative Negative   Hep B Core Ab, Total Negative Negative   Ferritin 15 - 150 ng/mL 62   Iron % Saturation 15 - 55 % 29   SONIA, IFA  Negative   ASMCA 0 - 19 Units 8   M2 Ab 0.0 - 20.0 Units 11.4   Ceruloplasmin 16.0 - 45.0 mg/dL 30.3   Alpha-1 antitrypsin level 90 - 200 mg/dL 199     7/2014. Anti-HBsurface negative, anti-HCV negative. LIVER HISTOLOGY:  8/2014. Slides reviewed by MLS. Cirrhosis with minimal steatosis and no inflammation. Etiology most likely BOURNE.  09/2016. Shear wave elastography. E median is 16.77 kPa. This is highly suggestive of cirrhosis. The E Std is 4.76. This wide distrubution of data indicates fatty liver. 05/2018. TRANSIENT HEPATIC ELASTOGRAPHY:   E Range: 9.11-14.61 kPa  E Mean: 11.06 kPa  E Median: 11.54 kPa  E Std: 2.17 kPa     ENDOSCOPIC PROCEDURES:  9/2014. EGD by MLS. Small esophageal varices. No banding performed. Gastitis. H.Pylori negative. 9/2015. EGD by Dr. Pamela Harper. Normal esophagus. Repeat in two years. 11/2017. EGD by Dr. Jacques Guzman. Simple esophageal varices, portal hypertensive gastropathy. 03/2018. EGD by Dr. Pamela Harper. Small esophageal varices. No banding performed. Repeat in one year. RADIOLOGY:  6/2014. Ultrasound of liver. Echogenic consistent with cirrhosis. No liver mass lesions. No dilated bile ducts. No ascites. Recanalization of umbilical vein. 6/2014. CT scan abdomen with IV contrast.  Changes consistent with cirrhosis. No liver mass lesions. No dilated bile ducts. No ascites. 01/2015. Ultrasound of liver. Diffusely coarsened and echogenic hepatic echotexture. No focal lesion identified. 05/2015. Ultrasound of the liver. Compatible with cirrhosis. No focal hepatic lesion. 05/2015. CT of the liver. Cirrhotic appearing liver. No focal hepatic lesion. Findings in keeping with portal hypertension, no ascites. 11/2015. US of the liver. Small echogenic liver compatible with history of cirrhosis, with no new focal intrahepatic lesion. 09/2016. Ultrasound of the liver. Consistent with cirrhosis. No hepatic masses. 05/2017. Ultrasound of the liver. Nodular hepatic contour with underlying parenchymal stigmata of cirrhosis. No focal hepatic lesion. 10/2017. Ultrasound of the liver. Heterogeneous increase in echogenicity without focal lesion. Possible recanalized umbilical vein. 05/2018. Ultrasound of the liver. Hepatic cirrhosis with recanalized umbilical vein. No focal hepatic mass. 06/2018. CT Abd/Pel w/contrast.  Cirrhosis with sequela of portal hypertension. This is manifested by splenomegaly, recannulized paraumbilical vein and gastroesophageal varices. 08/2018. CT Abd/Pel w/IV contrast only. Cirrhosis of the liver with portal venous hypertension. 09/2018. MRI/MRCP Abdomen w/wo contrast.  Abnormal hepatic morphology suggesting parenchymal disease/cirrhosis. No discrete hepatic mass is identified. Intra-abdominal varicosities are present. There is moderate splenic enlargement. Small cystic pancreatic masses are noted. OTHER TESTING:  Not available or performed    ASSESSMENT AND PLAN:  Cryptogenic cirrhosis.   This is likely secondary to BOURNE given DM and the finding of minimal steatosis on the liver biopsy. However, she does have positive autoimmune markers and manifests some autoimmune disorders, so this could be of resolving autoimmune etiology. The most recent laboratory studies indicate that the liver transaminases are normal, alkaline phosphatase is elevated, tests of hepatic synthetic and metabolic function are normal, and the platelet count is depressed. Complications of cirrhosis were discussed in detail. We discussed thrombocytopenia, portal hypertension, varices, GI bleeding, peripheral edema, ascites, hepatic encephalopathy, and hepatocellular carcinoma. We discussed the need for follow ups on a regular basis to monitor for complications. We discussed the need for every 6 month liver imaging studies. The patient does not require liver transplant evaluation at this time and may never require this if liver function remains normal.      EGD was performed in 03/2018 by Dr. Ling Gibson. The patient has small esophageal varices. These were not banded. Repeat in one year. Hepatic encephalopathy has not developed to date. There is no need for treatment with lactulose and/or xifaxan at this time. The patient has developed minimal edema which was well controlled with low dose diuretics until about 3 months ago. I increased her diuretics to step one at her office visit in 11/2017. She had been on 50 mg aldactone alone. Salt restrictions discussed. Continue step diuretics. The patient was directed to continue all current medications at the current dosages. There are no contraindications for the patient to take any medications that are necessary for treatment of other medical issues. The patient was counseled regarding alcohol consumption. Thrombocytopenia secondary to cirrhosis. There is no evidence of overt bleeding. There is no indication for platelet transfusions or pharmacologic treatment to increase the platelet count.     Vaccination for viral hepatitis A is not needed. The patient has serologic evidence of prior exposure or vaccination with immunity. [address] screening has recently been performed and does not suggest [address] Repeat imaging is due now. Repeat ultrasound was ordered and will be scheduled. Shear wave elastography suggests that the cirrhosis is resolving with weight loss. Will perform shear wave elastography annually to document hepatic fibrosis. This was ordered today. Elastography  can assess liver fibrosis and determine if a patient has advanced fibrosis or cirrhosis without the need for liver biopsy. 25 minutes total time spent with this patient with more than 50% of this time spent counseling and coordinating care as described above. Follow-up [facility] as scheduled in 01/2019.      Cintia Navarro NP   [facility] [place] of [address] [place] [address], [address] [place] [address] [place], [facility]  [phone]

## 2019-04-25 NOTE — PROGRESS NOTES
1. Have you been to the ER, urgent care clinic since your last visit? Hospitalized since your last visit? Yes [facility]    2. Have you seen or consulted any other health care providers outside of the [address] [place] [address] since your last visit? Include any pap smears or colon screening. Yes .

## 2019-04-26 LAB
AFP L3 MFR SERPL: 7.1 % (ref 0–9.9)
AFP SERPL-MCNC: 5.9 NG/ML (ref 0–8)

## 2019-06-07 ENCOUNTER — HOSPITAL ENCOUNTER (OUTPATIENT)
Dept: ULTRASOUND IMAGING | Age: 69
Discharge: HOME | End: 2019-06-07
Payer: MEDICARE

## 2019-06-07 DIAGNOSIS — K74.60 CIRRHOSIS OF LIVER WITHOUT ASCITES, UNSPECIFIED HEPATIC CIRRHOSIS TYPE (HCC): ICD-10-CM

## 2019-06-07 PROCEDURE — 76981 USE PARENCHYMA: CPT

## 2019-06-17 NOTE — PROGRESS NOTES
Please let her know that her ultrasound is unremarkable. No hepatic masses. Elastography suggests a Metavir fibrosis score of F3, moderate to severe hepatic fibrosis. Thank you.

## 2019-10-14 DIAGNOSIS — K74.69 CIRRHOSIS, CRYPTOGENIC (HCC): Primary | ICD-10-CM

## 2019-10-14 NOTE — PROGRESS NOTES
Ultrasound guided paracentesis ordered for complaints of a 20 pound weight gain through fluid accumulation in her belly. Follow up as scheduled on 10/24/2019. Will adjust diuretics at that time.

## 2019-10-18 RX ORDER — ALBUMIN HUMAN 250 G/1000ML
25 SOLUTION INTRAVENOUS AS NEEDED
Status: CANCELLED | OUTPATIENT
Start: 2019-10-22

## 2019-10-22 ENCOUNTER — HOSPITAL ENCOUNTER (OUTPATIENT)
Dept: ULTRASOUND IMAGING | Age: 69
Discharge: HOME | End: 2019-10-22

## 2019-10-24 ENCOUNTER — HOSPITAL ENCOUNTER (OUTPATIENT)
Dept: LAB | Age: 69
Discharge: HOME | End: 2019-10-24
Payer: MEDICARE

## 2019-10-24 ENCOUNTER — OFFICE VISIT (OUTPATIENT)
Dept: HEMATOLOGY | Age: 69
End: 2019-10-24

## 2019-10-24 VITALS
BODY MASS INDEX: 32.57 KG/M2 | HEART RATE: 53 BPM | DIASTOLIC BLOOD PRESSURE: 65 MMHG | OXYGEN SATURATION: 97 % | TEMPERATURE: 98 F | WEIGHT: 195.5 LBS | HEIGHT: 65 IN | SYSTOLIC BLOOD PRESSURE: 181 MMHG

## 2019-10-24 DIAGNOSIS — K74.69 CIRRHOSIS, CRYPTOGENIC (HCC): ICD-10-CM

## 2019-10-24 DIAGNOSIS — K74.69 CIRRHOSIS, CRYPTOGENIC (HCC): Primary | ICD-10-CM

## 2019-10-24 LAB
ALBUMIN SERPL-MCNC: 3.1 G/DL (ref 3.4–5)
ALBUMIN/GLOB SERPL: 0.8 {RATIO} (ref 0.8–1.7)
ALP SERPL-CCNC: 168 U/L (ref 45–117)
ALT SERPL-CCNC: 78 U/L (ref 13–56)
ANION GAP SERPL CALC-SCNC: 4 MMOL/L (ref 3–18)
AST SERPL-CCNC: 63 U/L (ref 10–38)
BASOPHILS # BLD: 0 K/UL (ref 0–0.1)
BASOPHILS NFR BLD: 0 % (ref 0–2)
BILIRUB DIRECT SERPL-MCNC: 0.5 MG/DL (ref 0–0.2)
BILIRUB SERPL-MCNC: 1.4 MG/DL (ref 0.2–1)
BUN SERPL-MCNC: 13 MG/DL (ref 7–18)
BUN/CREAT SERPL: 13 (ref 12–20)
CALCIUM SERPL-MCNC: 9 MG/DL (ref 8.5–10.1)
CHLORIDE SERPL-SCNC: 105 MMOL/L (ref 100–111)
CO2 SERPL-SCNC: 32 MMOL/L (ref 21–32)
CREAT SERPL-MCNC: 1.03 MG/DL (ref 0.6–1.3)
DIFFERENTIAL METHOD BLD: ABNORMAL
EOSINOPHIL # BLD: 0.1 K/UL (ref 0–0.4)
EOSINOPHIL NFR BLD: 2 % (ref 0–5)
ERYTHROCYTE [DISTWIDTH] IN BLOOD BY AUTOMATED COUNT: 13.8 % (ref 11.6–14.5)
GLOBULIN SER CALC-MCNC: 4 G/DL (ref 2–4)
GLUCOSE SERPL-MCNC: 175 MG/DL (ref 74–99)
HCT VFR BLD AUTO: 40.3 % (ref 35–45)
HGB BLD-MCNC: 13.3 G/DL (ref 12–16)
INR PPP: 1.1 (ref 0.8–1.2)
LYMPHOCYTES # BLD: 1.2 K/UL (ref 0.9–3.6)
LYMPHOCYTES NFR BLD: 20 % (ref 21–52)
MCH RBC QN AUTO: 33 PG (ref 24–34)
MCHC RBC AUTO-ENTMCNC: 33 G/DL (ref 31–37)
MCV RBC AUTO: 100 FL (ref 74–97)
MONOCYTES # BLD: 0.6 K/UL (ref 0.05–1.2)
MONOCYTES NFR BLD: 9 % (ref 3–10)
NEUTS SEG # BLD: 4.1 K/UL (ref 1.8–8)
NEUTS SEG NFR BLD: 69 % (ref 40–73)
PLATELET # BLD AUTO: 96 K/UL (ref 135–420)
PMV BLD AUTO: 10.6 FL (ref 9.2–11.8)
POTASSIUM SERPL-SCNC: 3.9 MMOL/L (ref 3.5–5.5)
PROT SERPL-MCNC: 7.1 G/DL (ref 6.4–8.2)
PROTHROMBIN TIME: 14.4 SEC (ref 11.5–15.2)
RBC # BLD AUTO: 4.03 M/UL (ref 4.2–5.3)
SODIUM SERPL-SCNC: 141 MMOL/L (ref 136–145)
WBC # BLD AUTO: 6 K/UL (ref 4.6–13.2)

## 2019-10-24 PROCEDURE — 82107 ALPHA-FETOPROTEIN L3: CPT

## 2019-10-24 PROCEDURE — 80048 BASIC METABOLIC PNL TOTAL CA: CPT

## 2019-10-24 PROCEDURE — 85025 COMPLETE CBC W/AUTO DIFF WBC: CPT

## 2019-10-24 PROCEDURE — 80076 HEPATIC FUNCTION PANEL: CPT

## 2019-10-24 PROCEDURE — 85610 PROTHROMBIN TIME: CPT

## 2019-10-24 PROCEDURE — 36415 COLL VENOUS BLD VENIPUNCTURE: CPT

## 2019-10-24 RX ORDER — ORPHENADRINE CITRATE 100 MG/1
100 TABLET, EXTENDED RELEASE ORAL 2 TIMES DAILY
Qty: 60 TAB | Refills: 11 | Status: SHIPPED | OUTPATIENT
Start: 2019-10-24 | End: 2020-07-30

## 2019-10-25 LAB
AFP L3 MFR SERPL: 6.6 % (ref 0–9.9)
AFP SERPL-MCNC: 6.5 NG/ML (ref 0–8)

## 2019-10-31 NOTE — PROGRESS NOTES
[facility] [place], Jose G ALMANZA Leonor Artist, MD Missie Flicker, PAJACOBO Carvalho, ACNP-BC     Esme Marinelli, [facility]   Zohaib Haney, FNP-C    Mery Clifford, [facility]-BC       Paulie Bryant [facility] 136    at [address], [address] [place] [address], [facility] 22.    [phone]    FAX: [address] [place] [address], [address]    [phone]    FAX: [phone]       Patient Care Team:  Fernando Gomez NP as PCP - General (Nurse Practitioner)  Bernice Valverde MD (Internal Medicine)  Vladimir Henderson MD (Ophthalmology)  Rachele Arredondo MD as Consulting Provider  Phillip Noriega MD (Hospitalist)      Problem List  Date Reviewed: 4/25/2019          Codes Class Noted    Type 2 diabetes with nephropathy [facility]) ICD-10-CM: E11.21  ICD-9-CM: 250.40, 583.81  4/19/2018        Cirrhosis, cryptogenic ([facility].) ICD-10-CM: K74.69  ICD-9-CM: 571.5  7/26/2014        Diabetes mellitus ([facility].) ICD-10-CM: E11.9  ICD-9-CM: 250.00  7/21/2014        Hypothyroidism ICD-10-CM: E03.9  ICD-9-CM: 244.9  7/21/2014        S/P JAYY (total abdominal hysterectomy) ICD-10-CM: Z90.710  ICD-9-CM: V88.01  7/21/2014        S/P cataract surgery ICD-10-CM: Z98.49  ICD-9-CM: V45.61  7/21/2014        Previous back surgery ICD-10-CM: Z98.890  ICD-9-CM: V45.89  7/21/2014                Ember Sloan returns to the [address] for management of cryptogenic cirrhosis. The active problem list, all pertinent past medical history, medications, liver histology, radiologic findings and laboratory findings related to the liver disorder were reviewed with the patient. The patient has not developed ascites. Mass behind left retina.   Is being seen at [facility] regarding this. Macular degeneration in right eye. Edema had resolved with step 1.5 diuretics, 60 mg lasix and 150 mg aldactone/day. The patient has not developed hepatic encephalopathy. Imaging of the liver was performed in 09/2018 with MRI/MRCP. Consistent with cirrhosis. No focal lesion identified. Shear wave elastography was performed in 09/2016, 05/2018, and again in 06/2019. This suggests cirrhosis and fatty liver. EGD in 9/2015 demonstrated normal esophagus. The EGD was repeated by Dr. Carlyn Narvaez in 11/2017. This demonstrated simple esophageal varices and portal hypertensive gastropathy. Most recent EGD was performed by Dr. Janelle Armstrong in 03/2018 and demonstrated small esophageal varices and mild portal hypertensive gastropathy of the body of the stomach. No gastric varices identified. The patient underwent a liver biopsy in 09/2014. Dr. Janelle Armstrong has reviewed the biopsy slides. This demonstrated inactive cirrhosis with only minimal steatosis and no inflammation. The most recent laboratory studies indicate that the liver transaminases are elevated, alkaline phosphatase is elevated, tests of hepatic synthetic and metabolic function are depressed, and the platelet count is depressed. The patient has no complaints which can be attributed to liver disease. The patient denies fatigue since last visit. The patient completes all daily activities without any functional limitations. The patient has not experienced fevers, chills, shortness of breath, chest pain, pain in the right side over the liver, diffuse abdominal pain, nausea, vomiting, constipation, diarrhrea, dry eyes, dry mouth, arthralgias, myalgias, yellowing of the eyes or skin, itching, dark urine, problems concentrating, swelling of the abdomen, swelling of the lower extremities, hematemesis, or hematochezia.     ALLERGIES:  No Known Allergies    MEDICATIONS  Current Outpatient Medications   Medication Sig    orphenadrine citrate (NORFLEX) 100 mg sr tablet Take 1 Tab by mouth two (2) times a day.  furosemide (LASIX) 40 mg tablet Take 1.5 Tabs by mouth daily.  spironolactone (ALDACTONE) 100 mg tablet Take 150 mg aldactone daily.  spironolactone (ALDACTONE) 50 mg tablet TAKE 150 MG ALDACTONE DAILY    losartan (COZAAR) 25 mg tablet Take  by mouth daily. Take a half tablet once a day.  insulin NPH (NOVOLIN N, HUMULIN N) 100 unit/mL injection 35 Units by SubCUTAneous route two (2) times a day.  levothyroxine (SYNTHROID) 100 mcg tablet Take 100 mcg by mouth Daily (before breakfast). Indications: HYPOTHYROIDISM     No current facility-administered medications for this visit. REVIEW OF SYSTEMS:  Systems related to all organ systems were reviewed. All were negative except as noted above. FAMILY HISTORY:  The father  of CVA. The mother  of cirrhosis. She did not consume alcohol. There are no other family members with liver disease. SOCIAL HISTORY:  The patient is . The patient has 4 children, and 8 grandchildren. The patient has never used tobacco products. The patient has never consumed significant amounts of alcohol. The patient currently works part time for a . PHYSICAL EXAMINATION:  Visit Vitals  /65   Pulse (!) 53   Temp 98 °F (36.7 °C) (Tympanic)   Ht 5' 5\" (1.651 m)   Wt 195 lb 8 oz (88.7 kg)   SpO2 97%   BMI 32.53 kg/m²     General: No acute distress. Eyes: Sclera anicteric. ENT: No oral lesions. Thyroid normal.  Nodes: No adenopathy. Skin: No spider angiomata. No jaundice. No palmar erythema. Respiratory: Lungs clear to auscultation. Cardiovascular: Regular heart rate. No murmurs. No JVD. Abdomen: Soft non-tender. Liver size normal to percussion/palpation. Spleen not palpable. Belly is firm. Extremities: Trace lower extremity edema. No muscle wasting. No gross arthritic changes. Neurologic: Alert and oriented. Cranial nerves grossly intact. No asterixsis. LABORATORY STUDIES:  Liver [place] of [address] [place] [address] & Units 10/24/2019 4/25/2019   WBC 4.6 - 13.2 K/uL 6.0 6.6   ANC 1.8 - 8.0 K/UL 4.1 3.7   HGB 12.0 - 16.0 g/dL 13.3 14.0    - 420 K/uL 96 (L) 124 (L)   INR 0.8 - 1.2   1.1 1.1   AST 10 - 38 U/L 63 (H) 53 (H)   ALT 13 - 56 U/L 78 (H) 43   Alk Phos 45 - 117 U/L 168 (H) 140 (H)   Bili, Total 0.2 - 1.0 MG/DL 1.4 (H) 1.8 (H)   Bili, Direct 0.0 - 0.2 MG/DL 0.5 (H) 0.6 (H)   Albumin 3.4 - 5.0 g/dL 3.1 (L) 3.1 (L)   BUN 7.0 - 18 MG/DL 13 16   Creat 0.6 - 1.3 MG/DL 1.03 1.03   Na 136 - 145 mmol/L 141 139   K 3.5 - 5.5 mmol/L 3.9 3.9   Cl 100 - 111 mmol/L 105 100   CO2 21 - 32 mmol/L 32 30   Glucose 74 - 99 mg/dL 175 (H) 163 (H)     Cancer Screening Latest Ref Rng & Units 10/24/2019 4/25/2019 10/3/2018   AFP, Serum 0.0 - 8.0 ng/mL 6.5 5.9 5.3   AFP-L3% 0.0 - 9.9 % 6.6 7.1 8.7   CEA ng/mL   3.5       SEROLOGIES:  Serologies Latest Ref Rng 7/21/2014   Hep A Ab, Total Negative Positive (A)   Hep B Surface Ag Negative Negative   Hep B Core Ab, Total Negative Negative   Ferritin 15 - 150 ng/mL 62   Iron % Saturation 15 - 55 % 29   SONIA, IFA  Negative   ASMCA 0 - 19 Units 8   M2 Ab 0.0 - 20.0 Units 11.4   Ceruloplasmin 16.0 - 45.0 mg/dL 30.3   Alpha-1 antitrypsin level 90 - 200 mg/dL 199     7/2014. Anti-HBsurface negative, anti-HCV negative. LIVER HISTOLOGY:  8/2014. Slides reviewed by MLS. Cirrhosis with minimal steatosis and no inflammation. Etiology most likely BOURNE.  09/2016. Shear wave elastography. E median is 16.77 kPa. This is highly suggestive of cirrhosis. The E Std is 4.76. This wide distrubution of data indicates fatty liver. 05/2018. TRANSIENT HEPATIC ELASTOGRAPHY:   E Range: 9.11-14.61 kPa  E Mean: 11.06 kPa  E Median: 11.54 kPa  E Std: 2.17 kPa     06/2019.  TRANSIENT HEPATIC ELASTOGRAPHY:   E Range: 8.71 - 13.76 kPa  E Mean: 11.07 kPa  E Median: 10.91 kPa  E Std: 1.65 kPa     ENDOSCOPIC PROCEDURES:  9/2014. EGD by MLS. Small esophageal varices. No banding performed. Gastitis. H.Pylori negative. 9/2015. EGD by Dr. Kia Weinstein. Normal esophagus. Repeat in two years. 11/2017. EGD by Dr. Carrie Warren. Simple esophageal varices, portal hypertensive gastropathy. 03/2018. EGD by Dr. Kia Weinstein. Small esophageal varices. No banding performed. Repeat in one year. RADIOLOGY:  6/2014. Ultrasound of liver. Echogenic consistent with cirrhosis. No liver mass lesions. No dilated bile ducts. No ascites. Recanalization of umbilical vein. 6/2014. CT scan abdomen with IV contrast.  Changes consistent with cirrhosis. No liver mass lesions. No dilated bile ducts. No ascites. 01/2015. Ultrasound of liver. Diffusely coarsened and echogenic hepatic echotexture. No focal lesion identified. 05/2015. Ultrasound of the liver. Compatible with cirrhosis. No focal hepatic lesion. 05/2015. CT of the liver. Cirrhotic appearing liver. No focal hepatic lesion. Findings in keeping with portal hypertension, no ascites. 11/2015. US of the liver. Small echogenic liver compatible with history of cirrhosis, with no new focal intrahepatic lesion. 09/2016. Ultrasound of the liver. Consistent with cirrhosis. No hepatic masses. 05/2017. Ultrasound of the liver. Nodular hepatic contour with underlying parenchymal stigmata of cirrhosis. No focal hepatic lesion. 10/2017. Ultrasound of the liver. Heterogeneous increase in echogenicity without focal lesion. Possible recanalized umbilical vein. 05/2018. Ultrasound of the liver. Hepatic cirrhosis with recanalized umbilical vein. No focal hepatic mass. 06/2018. CT Abd/Pel w/contrast.  Cirrhosis with sequela of portal hypertension. This is manifested by splenomegaly, recannulized paraumbilical vein and gastroesophageal varices. 08/2018. CT Abd/Pel w/IV contrast only.   Cirrhosis of the liver with portal venous hypertension. 09/2018. MRI/MRCP Abdomen w/wo contrast.  Abnormal hepatic morphology suggesting parenchymal disease/cirrhosis. No discrete hepatic mass is identified. Intra-abdominal varicosities are present. There is moderate splenic enlargement. Small cystic pancreatic masses are noted. 06/2019. Ultrasound of the liver. Heterogeneous internal echotexture. No focal mass. OTHER TESTING:  Not available or performed    ASSESSMENT AND PLAN:  Cryptogenic cirrhosis. This is likely secondary to BOURNE given DM and the finding of minimal steatosis on the liver biopsy. However, she does have positive autoimmune markers and manifests some autoimmune disorders, so this could be of resolving autoimmune etiology. The most recent laboratory studies indicate that the liver transaminases are normal, alkaline phosphatase is elevated, tests of hepatic synthetic and metabolic function are normal, and the platelet count is depressed. Complications of cirrhosis were discussed in detail. We discussed thrombocytopenia, portal hypertension, varices, GI bleeding, peripheral edema, ascites, hepatic encephalopathy, and hepatocellular carcinoma. We discussed the need for follow ups on a regular basis to monitor for complications. We discussed the need for every 6 month liver imaging studies. The patient does not require liver transplant evaluation at this time and may never require this if liver function remains normal.      EGD was performed in 03/2018 by Dr. Daniel Sandra. The patient has small esophageal varices. Repeat EGD was ordered today. Hepatic encephalopathy has not developed to date. There is no need for treatment with lactulose and/or xifaxan at this time. The patient has developed minimal edema which was well controlled with low dose diuretics. Now on step 1.5 diuretics, lasix 60 mg/day and aldactone 150 mg/day. Creatinine is 1.03. Continue this dose. Salt restrictions discussed.      The patient was directed to continue all current medications at the current dosages. There are no contraindications for the patient to take any medications that are necessary for treatment of other medical issues. The patient was counseled regarding alcohol consumption. Thrombocytopenia secondary to cirrhosis. There is no evidence of overt bleeding. There is no indication for platelet transfusions or pharmacologic treatment to increase the platelet count. Vaccination for viral hepatitis A is not needed. The patient has serologic evidence of prior exposure or vaccination with immunity. [address] screening has recently been performed and does not suggest [address] Repeat imaging is due now. Repeat ultrasound was ordered and will be scheduled. Shear wave elastography suggests that the cirrhosis is resolving with weight loss. Will perform fibroscan in the office annually to document hepatic fibrosis. Fibroscan can assess liver fibrosis and determine if a patient has advanced fibrosis or cirrhosis without the need for liver biopsy. 25 minutes total time spent with this patient with more than 50% of this time spent counseling and coordinating care as described above. [address] in 3 months.      Santy Lau NP   Liver [place] of [address] [place] [address], [address] [place] [address]  [phone]

## 2019-11-12 ENCOUNTER — HOSPITAL ENCOUNTER (OUTPATIENT)
Dept: PREADMISSION TESTING | Age: 69
Discharge: HOME | End: 2019-11-12
Attending: INTERNAL MEDICINE
Payer: MEDICARE

## 2019-11-12 LAB
EST. AVERAGE GLUCOSE BLD GHB EST-MCNC: 189 MG/DL
HBA1C MFR BLD: 8.2 % (ref 4.2–5.6)

## 2019-11-12 PROCEDURE — 36415 COLL VENOUS BLD VENIPUNCTURE: CPT

## 2019-11-12 PROCEDURE — 83036 HEMOGLOBIN GLYCOSYLATED A1C: CPT

## 2019-11-12 PROCEDURE — 93005 ELECTROCARDIOGRAM TRACING: CPT

## 2019-11-13 LAB
ATRIAL RATE: 52 BPM
CALCULATED P AXIS, ECG09: 55 DEGREES
CALCULATED R AXIS, ECG10: 72 DEGREES
CALCULATED T AXIS, ECG11: 49 DEGREES
DIAGNOSIS, 93000: NORMAL
P-R INTERVAL, ECG05: 178 MS
Q-T INTERVAL, ECG07: 458 MS
QRS DURATION, ECG06: 102 MS
QTC CALCULATION (BEZET), ECG08: 425 MS
VENTRICULAR RATE, ECG03: 52 BPM

## 2019-11-25 ENCOUNTER — ANESTHESIA EVENT (OUTPATIENT)
Dept: ENDOSCOPY | Age: 69
End: 2019-11-25
Payer: MEDICARE

## 2019-11-25 ENCOUNTER — HOSPITAL ENCOUNTER (OUTPATIENT)
Age: 69
Setting detail: OUTPATIENT SURGERY
Discharge: HOME | End: 2019-11-25
Attending: INTERNAL MEDICINE | Admitting: INTERNAL MEDICINE
Payer: MEDICARE

## 2019-11-25 ENCOUNTER — ANESTHESIA (OUTPATIENT)
Dept: ENDOSCOPY | Age: 69
End: 2019-11-25
Payer: MEDICARE

## 2019-11-25 VITALS
WEIGHT: 190.1 LBS | SYSTOLIC BLOOD PRESSURE: 134 MMHG | HEART RATE: 50 BPM | TEMPERATURE: 97 F | DIASTOLIC BLOOD PRESSURE: 56 MMHG | RESPIRATION RATE: 18 BRPM | HEIGHT: 65 IN | BODY MASS INDEX: 31.67 KG/M2 | OXYGEN SATURATION: 98 %

## 2019-11-25 LAB — GLUCOSE BLD STRIP.AUTO-MCNC: 141 MG/DL (ref 70–110)

## 2019-11-25 PROCEDURE — 76040000019: Performed by: INTERNAL MEDICINE

## 2019-11-25 PROCEDURE — 74011250636 HC RX REV CODE- 250/636: Performed by: INTERNAL MEDICINE

## 2019-11-25 PROCEDURE — 82962 GLUCOSE BLOOD TEST: CPT

## 2019-11-25 PROCEDURE — 74011000250 HC RX REV CODE- 250: Performed by: ANESTHESIOLOGY

## 2019-11-25 PROCEDURE — 76060000031 HC ANESTHESIA FIRST 0.5 HR: Performed by: INTERNAL MEDICINE

## 2019-11-25 PROCEDURE — 74011250636 HC RX REV CODE- 250/636: Performed by: ANESTHESIOLOGY

## 2019-11-25 PROCEDURE — 77030013805 HC LIGTR VRCES BSC -B: Performed by: INTERNAL MEDICINE

## 2019-11-25 RX ORDER — DEXTROMETHORPHAN/PSEUDOEPHED 2.5-7.5/.8
1.2 DROPS ORAL
Status: CANCELLED | OUTPATIENT
Start: 2019-11-25

## 2019-11-25 RX ORDER — EPINEPHRINE 0.1 MG/ML
1 INJECTION INTRACARDIAC; INTRAVENOUS
Status: CANCELLED | OUTPATIENT
Start: 2019-11-25 | End: 2019-11-26

## 2019-11-25 RX ORDER — MIDAZOLAM HYDROCHLORIDE 1 MG/ML
INJECTION, SOLUTION INTRAMUSCULAR; INTRAVENOUS AS NEEDED
Status: DISCONTINUED | OUTPATIENT
Start: 2019-11-25 | End: 2019-11-25 | Stop reason: HOSPADM

## 2019-11-25 RX ORDER — LIDOCAINE HYDROCHLORIDE 20 MG/ML
INJECTION, SOLUTION EPIDURAL; INFILTRATION; INTRACAUDAL; PERINEURAL AS NEEDED
Status: DISCONTINUED | OUTPATIENT
Start: 2019-11-25 | End: 2019-11-25 | Stop reason: HOSPADM

## 2019-11-25 RX ORDER — SODIUM CHLORIDE 0.9 % (FLUSH) 0.9 %
5-40 SYRINGE (ML) INJECTION AS NEEDED
Status: CANCELLED | OUTPATIENT
Start: 2019-11-25

## 2019-11-25 RX ORDER — PROPOFOL 10 MG/ML
INJECTION, EMULSION INTRAVENOUS AS NEEDED
Status: DISCONTINUED | OUTPATIENT
Start: 2019-11-25 | End: 2019-11-25 | Stop reason: HOSPADM

## 2019-11-25 RX ORDER — ATROPINE SULFATE 0.1 MG/ML
0.5 INJECTION INTRAVENOUS
Status: CANCELLED | OUTPATIENT
Start: 2019-11-25 | End: 2019-11-26

## 2019-11-25 RX ORDER — SODIUM CHLORIDE 9 MG/ML
100 INJECTION, SOLUTION INTRAVENOUS CONTINUOUS
Status: DISCONTINUED | OUTPATIENT
Start: 2019-11-25 | End: 2019-11-25 | Stop reason: HOSPADM

## 2019-11-25 RX ORDER — FLUMAZENIL 0.1 MG/ML
0.2 INJECTION INTRAVENOUS
Status: DISCONTINUED | OUTPATIENT
Start: 2019-11-25 | End: 2019-11-25 | Stop reason: HOSPADM

## 2019-11-25 RX ORDER — NALOXONE HYDROCHLORIDE 0.4 MG/ML
0.4 INJECTION, SOLUTION INTRAMUSCULAR; INTRAVENOUS; SUBCUTANEOUS
Status: DISCONTINUED | OUTPATIENT
Start: 2019-11-25 | End: 2019-11-25 | Stop reason: HOSPADM

## 2019-11-25 RX ORDER — SODIUM CHLORIDE 0.9 % (FLUSH) 0.9 %
5-40 SYRINGE (ML) INJECTION EVERY 8 HOURS
Status: CANCELLED | OUTPATIENT
Start: 2019-11-25

## 2019-11-25 RX ADMIN — MIDAZOLAM HYDROCHLORIDE 2 MG: 1 INJECTION, SOLUTION INTRAMUSCULAR; INTRAVENOUS at 09:02

## 2019-11-25 RX ADMIN — PROPOFOL 30 MG: 10 INJECTION, EMULSION INTRAVENOUS at 09:07

## 2019-11-25 RX ADMIN — SODIUM CHLORIDE 100 ML/HR: 900 INJECTION, SOLUTION INTRAVENOUS at 08:36

## 2019-11-25 RX ADMIN — PROPOFOL 40 MG: 10 INJECTION, EMULSION INTRAVENOUS at 09:05

## 2019-11-25 RX ADMIN — LIDOCAINE HYDROCHLORIDE 30 MG: 20 INJECTION, SOLUTION EPIDURAL; INFILTRATION; INTRACAUDAL; PERINEURAL at 09:05

## 2019-11-25 RX ADMIN — PROPOFOL 30 MG: 10 INJECTION, EMULSION INTRAVENOUS at 09:12

## 2019-11-25 NOTE — PROCEDURES
33[facility], MD, [address], Cite Cristobal Cummings MD Evins Hose, PA-C Janeann Barters, [facility]     Esme Marinelli, [facility]   SISI Duckworth, [facility]       Paulie [facility] 136    at [address], [facility] Susannah Benítez  22.    [phone]    FAX: [address] [place] [address], [facility] [address]    [phone]    FAX: [phone]         UPPER ENDOSCOPY PROCEDURE NOTE    Salena Sindykarl  1950    INDICATION: Cirrhosis. Screening for esophageal varices with variceal ligation if indicated. : Silas Carrillo MD    SURGICAL ASSISTANT:  None    PROSTHETIC DEVISES, TISSUE GRAFTS, ORGAN TRANSPLANTS:  Not applicable     ANESTHESIA/SEDATION: Per anesthesia    PROCEDURE DESCRIPTION:  [employer] consent was obtained from the patient for the procedure. All risks and benefits of the procedure explained. The patient was taken to the endoscopy suite and placed in the left lateral decubitus position. Following sequential administration of sedation to doses as indicated above the endoscope was inserted into the mouth and advanced under direct vision to the second portion of the duodenum. Careful inspection of upper gastrointestinal tract was made as the endoscope was inserted and withdrawn. Retroflexion of the endoscope to view of the cardia of the stomach was performed. After withdrawing the endoscope the banding devise was placed on the tip of the endoscope. The scope was then reinserted under direct inspection and advanced to the esophagus. Banding of esophageal varices was performed as described below. The scope was then removed.     FINDINGS:  Esophagus:    A few medium esophageal varices were identified. Banding of esophageal varices was performed. Excellent hemostasis was achieved after banding. Stomach:   Mild portal hypertensive gastropathy of the body of the stomach  Gastritis of the antrum  No gastric varices identified. Duodenum:   Normal bulb and second portion    INTERVENTION:   2 bands placed were placed on esophageal varices. COMPLICATIONS: None. The patient tolerated the procedure well. EBL: Negligible. RECOMMENDATIONS:  Observe until discharge parameters are achieved. Liquid diet today. Soft food tomorrow. Resume general diet thereafter. Repeat endoscopy to reassess varices and need for additional banding in 6 months. Follow-up Liver [facility] office as scheduled.       Fawn Villarreal MD  [address]  4 [facility], [address] [place], [place] [address]thelma De La Torre, [address]  12 [facility]

## 2019-11-25 NOTE — H&P
3340 [facility], Daniel ALMANZA, Cite Mongi Slim, Jeri Fleeting, MD Danita Holter, VIVI Gardner, [facility]     Esme Marinelli, [facility]   SISI Davis, [facility]       Paulie Bryant Fransico De Herrera 136    at [address], [address] Susannah Benítez  22.    [phone]    FAX: [address] [place] [address], [address]    [phone]    FAX: [phone]         PRE-PROCEDURE NOTE - EGD    H and P from last office visit reviewed. Allergies reviewed. Out-patient medicaton list reviewed. Patient Active Problem List   Diagnosis Code    [facility] E11.9    Hypothyroidism E03.9    S/P JAYY (total abdominal hysterectomy) Z90.710    S/P cataract surgery Z98.49    Previous back surgery Z98.890    Cirrhosis, cryptogenic ([facility] K74.69    Type 2 diabetes with nephropathy ([facility] E11.21       No Known Allergies    No current facility-administered medications on file prior to encounter. Current Outpatient Medications on File Prior to Encounter   Medication Sig Dispense Refill    furosemide (LASIX) 40 mg tablet Take 1.5 Tabs by mouth daily. 135 Tab 3    spironolactone (ALDACTONE) 100 mg tablet Take 150 mg aldactone daily. 90 Tab 3    spironolactone (ALDACTONE) 50 mg tablet TAKE 150 MG ALDACTONE DAILY 90 Tab 3    losartan (COZAAR) 25 mg tablet Take  by mouth daily. Take a half tablet once a day.  insulin NPH (NOVOLIN N, HUMULIN N) 100 unit/mL injection 35 Units by SubCUTAneous route two (2) times a day.  levothyroxine (SYNTHROID) 100 mcg tablet Take 100 mcg by mouth Daily (before breakfast).  Indications: HYPOTHYROIDISM      orphenadrine citrate (NORFLEX) 100 mg sr tablet Take 1 Tab by mouth two (2) times a day. 61 Tab 11       For EGD to assess for esophageal and gastric varices. Plan to perform banding if indicated based upon variceal size and appearance. The risks of the procedure were discussed with the patient. These included reaction to anesthesia, pain, perforation and bleeding. All questions were answered. The patient wishes to proceed with the procedure. PHYSICAL EXAMINATION:  VS: per nursing note  General: No acute distress. Eyes: Sclera anicteric. ENT: No oral lesions. Thyroid normal.  Nodes: No adenopathy. Skin: No spider angiomata. No jaundice. No palmar erythema. Respiratory: Lungs clear to auscultation. Cardiovascular: Regular heart rate. No murmurs. No JVD. Abdomen: Soft non-tender, liver size normal to percussion/palpation. Spleen not palpable. No obvious ascites. Extremities: No edema. No muscle wasting. No gross arthritic changes. Neurologic: Alert and oriented. Cranial nerves grossly intact. No asterixis. MOST RECENT LABORATORY STUDIES:  Lab Results   Component Value Date/Time    WBC 6.0 10/24/2019 10:24 AM    HGB 13.3 10/24/2019 10:24 AM    HCT 40.3 10/24/2019 10:24 AM    PLATELET 96 (L) 89/03/2867 10:24 AM    .0 (H) 10/24/2019 10:24 AM     Lab Results   Component Value Date/Time    INR 1.1 10/24/2019 10:24 AM    INR 1.1 04/25/2019 11:23 AM    INR 1.1 10/03/2018 02:24 PM    Prothrombin time 14.4 10/24/2019 10:24 AM    Prothrombin time 13.5 04/25/2019 11:23 AM    Prothrombin time 13.4 10/03/2018 02:24 PM       ASSESSMENT AND PLAN:  EGD to assess for esophageal and/or gastric varices.   Sedation per anesthesiology    MD Nina Urias  of [address], [address]  [phone]  [address]

## 2019-11-25 NOTE — ANESTHESIA POSTPROCEDURE EVALUATION
Post-Anesthesia Evaluation & Assessment    Visit Vitals  /56   Pulse (!) 50   Temp 36.1 °C (97 °F)   Resp 18   Ht 5' 4.5\" (1.638 m)   Wt 86.2 kg (190 lb 1.6 oz)   SpO2 98%   BMI 32.13 kg/m²       Nausea/Vomiting: Controlled. Post-operative hydration adequate. Pain Scale 1: Visual (11/25/19 0956)  Pain Intensity 1: 0 (11/25/19 0956)   Managed    Pain score at or below stated goal level. Mental status & Level of consciousness: alert and oriented x 3    Neurological status: moves all extremities, sensation grossly intact    Pulmonary status: airway patent, adequate oxygenation. Complications related to anesthesia: none    Patient has met all PACU discharge requirements.       Shagufta Ken, DO

## 2019-11-25 NOTE — ANESTHESIA PREPROCEDURE EVALUATION
Relevant Problems   No relevant active problems       Anesthetic History   No history of anesthetic complications            Review of Systems / Medical History  Patient summary reviewed, nursing notes reviewed and pertinent labs reviewed    Pulmonary  Within defined limits                 Neuro/Psych   Within defined limits           Cardiovascular  Within defined limits                Exercise tolerance: >4 METS     GI/Hepatic/Renal           Liver disease  Pertinent negatives: No GERD, PUD and renal disease  Comments: Idiopathic liver cirrhosis Endo/Other    Diabetes  Hypothyroidism  Obesity and arthritis  Pertinent negatives: No hyperthyroidism   Other Findings              Physical Exam    Airway  Mallampati: III  TM Distance: 4 - 6 cm  Neck ROM: normal range of motion   Mouth opening: Normal     Cardiovascular  Regular rate and rhythm,  S1 and S2 normal,  no murmur, click, rub, or gallop             Dental      Comments: Few chipped upper/lower molars   Pulmonary  Breath sounds clear to auscultation               Abdominal  GI exam deferred       Other Findings            Anesthetic Plan    ASA: 4  Anesthesia type: MAC          Induction: Intravenous  Anesthetic plan and risks discussed with: Patient and Spouse

## 2019-11-25 NOTE — DISCHARGE INSTRUCTIONS
3340 \[facility] [place] [facility]\"", MD, [address], Cite Eugene Jones, Marsh Crigler, MD Lucile Aures, PA-C Tye Andrew, [facility]     Esme Marinelli, [facility]   Atif Jones ALEX Aguirre, [facility]       Paulie Bryant [facility] 136    at [address], [facility] Susannah Benítez  22.    [phone]    FAX: [address] [place] [address], [address]    [phone]    FAX: [phone]         ENDOSCOPY WITH BANDING DISCHARGE INSTRUCTIONS    Xiomy Chakraborty  1950  Date: 11/25/2019    DISCOMFORT:  Use lozenges or warm salt water gargle for sore thoat  Apply warm compress to IV site if red. If redness or soreness persists call the office. You may experience gas and bloating. Walking and belching will help relieve this. You may experience chest pain or discomfort or feel as though food is \"sticking\" in your food pipe for a few days after the procedure. This is a normal feeling after banding of esophageal varices. CHEST PRESSURE:  Banding of dilated veins (varices) in the food tube (esophagus) can be painful in some persons. The pain is caused by squeezing the varices and lining of the esophagus by the bands used to seal the varices. You can take liquid pain medication either acetaminophen or alternative. The best treatment for this is to drink a an \"Icee\" or \"Slurpee\" since the ice crystals will freeze the nerve endings in the food tube and relieve the pain. DIET:  Regular food may dislodge the bands placed on the varices. For this reason you should only have liquid for the rest of today. Eat only soft food that does not need to be chewed all day tomorrow. You may advance to your regular diet in 2 days.     ACTIVITY:  Spend the remainder of the day resting. Avoid any strenuous activity. You may not operate a vehicle for 12 hours. You may not engage in an occupation involving machinery or appliances for rest of today. Avoid making any critical decisions for at least 24 hour. Call the [facility] [address] [facility] if you have any of the following:  Increasing chest or abdominal pain, nausea, vomiting, vomiting blood, abdominal distension or swelling, fever or chills, bloody discharge from nose or mouth or shortness of breath. Follow-up Instructions:  Call Dr. Marleni Chang for any questions or problems at the phone number listed above. If a biopsy was performed, you will be contacted by the office staff or Dr Marleni Chang within 1 week. If you have not heard from us by then you may call the office at the phone number listed above to inquire about the results. ENDOSCOPY FINDINGS:  Two varices were found in the esophagus (food tube). 2 bands were placed to seal the varices and reduce the risk of bleeding. Repeat EGD in 6 months    DISCHARGE SUMMARY from the Nurse:   The following personal items collected during your admission are returned to you:   Dental Appliance: Dental Appliances: None  Vision: Visual Aid: None  Hearing Aid:    Jewelry:    Clothing:    Other Valuables:    Valuables sent to safe:          DISCHARGE SUMMARY from Nurse    The following personal items collected during your admission are returned to you:   Dental Appliance: Dental Appliances: None  Vision: Visual Aid: None  Hearing Aid:    Jewelry:    Clothing:    Other Valuables:    Valuables sent to safe:              PATIENT INSTRUCTIONS:    After general anesthesia or intravenous sedation, for 24 hours or while taking prescription Narcotics:  · Limit your activities  · Do not drive and operate hazardous machinery  · Do not make important personal or business decisions  · Do  not drink alcoholic beverages  · If you have not urinated within 8 hours after discharge, please contact your surgeon on call. Report the following to your surgeon:  · Excessive pain, swelling, redness or odor of or around the surgical area  · Temperature over 100.5  · Nausea and vomiting lasting longer than 4 hours or if unable to take medications  · Any signs of decreased circulation or nerve impairment to extremity: change in color, persistent  numbness, tingling, coldness or increase pain  · Any questions      Follow-up Appointments   Procedures    FOLLOW UP VISIT Appointment in: Other (Specify) AS scheduled     AS scheduled     Standing Status:   Standing     Number of Occurrences:   1     Order Specific Question:   Appointment in     Answer: Other (Specify)       What to do at Home:  Recommended activity: as above,     If you experience any of the following symptoms as above, please follow up with Dr. Katherine Dang. *  Please give a list of your current medications to your Primary Care Provider. *  Please update this list whenever your medications are discontinued, doses are      changed, or new medications (including over-the-counter products) are added. *  Please carry medication information at all times in case of emergency situations. These are general instructions for a healthy lifestyle:    No smoking/ No tobacco products/ Avoid exposure to second hand smoke    Surgeon General's Warning:  Quitting smoking now greatly reduces serious risk to your health. Obesity, smoking, and sedentary lifestyle greatly increases your risk for illness    A healthy diet, regular physical exercise & weight monitoring are important for maintaining a healthy lifestyle    You may be retaining fluid if you have a history of heart failure or if you experience any of the following symptoms:  Weight gain of 3 pounds or more overnight or 5 pounds in a week, increased swelling in our hands or feet or shortness of breath while lying flat in bed.   Please call your doctor as soon as you notice any of these symptoms; do not wait until your next office visit. Recognize signs and symptoms of STROKE:    F-face looks uneven    A-arms unable to move or move unevenly    S-speech slurred or non-existent    T-time-call 911 as soon as signs and symptoms begin-DO NOT go       Back to bed or wait to see if you get better-TIME IS BRAIN. The discharge information has been reviewed with the patient and spouse. The patient and spouse verbalized understanding. Warning Signs of HEART ATTACK     Call 911 if you have these symptoms:   Chest discomfort. Most heart attacks involve discomfort in the center of the chest that lasts more than a few minutes, or that goes away and comes back. It can feel like uncomfortable pressure, squeezing, fullness, or pain.  Discomfort in other areas of the upper body. Symptoms can include pain or discomfort in one or both arms, the back, neck, jaw, or stomach.  Shortness of breath with or without chest discomfort.  Other signs may include breaking out in a cold sweat, nausea, or lightheadedness. Don't wait more than five minutes to call 911 - MINUTES MATTER! Fast action can save your life. Calling 911 is almost always the fastest way to get lifesaving treatment. Emergency Medical Services staff can begin treatment when they arrive -- up to an hour sooner than if someone gets to the hospital by car. The discharge information has been reviewed with the patient and caregiver. The patient and caregiver verbalized understanding. Discharge medications reviewed with the patient and guardian and appropriate educational materials and side effects teaching were provided.     Patient armband removed and shredded

## 2019-12-30 DIAGNOSIS — K74.69 CRYPTOGENIC CIRRHOSIS (HCC): Primary | ICD-10-CM

## 2020-07-16 ENCOUNTER — HOSPITAL ENCOUNTER (OUTPATIENT)
Dept: ULTRASOUND IMAGING | Age: 70
Discharge: HOME | End: 2020-07-16
Payer: MEDICARE

## 2020-07-16 DIAGNOSIS — K74.69 CIRRHOSIS, CRYPTOGENIC (HCC): ICD-10-CM

## 2020-07-16 PROCEDURE — 76705 ECHO EXAM OF ABDOMEN: CPT

## 2020-07-30 ENCOUNTER — OFFICE VISIT (OUTPATIENT)
Dept: HEMATOLOGY | Age: 70
End: 2020-07-30

## 2020-07-30 VITALS
WEIGHT: 184 LBS | HEART RATE: 56 BPM | OXYGEN SATURATION: 99 % | DIASTOLIC BLOOD PRESSURE: 54 MMHG | BODY MASS INDEX: 31.1 KG/M2 | SYSTOLIC BLOOD PRESSURE: 144 MMHG | TEMPERATURE: 97.3 F

## 2020-07-30 DIAGNOSIS — K74.69 CIRRHOSIS, CRYPTOGENIC (HCC): Primary | ICD-10-CM

## 2020-07-30 RX ORDER — VITAMIN B COMPLEX
1 CAPSULE ORAL DAILY
COMMUNITY
End: 2020-11-10

## 2020-07-30 RX ORDER — THERA TABS 400 MCG
1 TAB ORAL DAILY
COMMUNITY

## 2020-07-30 NOTE — PROGRESS NOTES
[address] [place] [address], Lupe ALMANZA Sophie Logan, MD Celina Mellow, PA-CALE Au, [facility]     Esme Rebollarworth, [facility]   [place] Eusebio FNP-C    Butch Richter, [facility]       Pauliefortunato Bryant [facility] 136    at [address]    217 [facility], [address] [place] [address], [facility] 22.    [phone]    FAX: [address] [place] [address], [address]    [phone]    FAX: [phone]       Patient Care Team:  Rina Mendez NP as PCP - General (Nurse Practitioner)  Hai Hand MD (Internal Medicine)  Any Linda MD (Ophthalmology)  Rachele Arredondo MD as Consulting Provider  Seth Grijalva MD (Hospitalist)      Problem List  Date Reviewed: 7/30/2020          Codes Class Noted    Type 2 diabetes with nephropathy [facility]) ICD-10-CM: E11.21  ICD-9-CM: 250.40, 583.81  4/19/2018        Cirrhosis, cryptogenic ([facility] ICD-10-CM: K74.69  ICD-9-CM: 571.5  7/26/2014        Diabetes mellitus ([facility] ICD-10-CM: E11.9  ICD-9-CM: 250.00  7/21/2014        Hypothyroidism ICD-10-CM: E03.9  ICD-9-CM: 244.9  7/21/2014        S/P JAYY (total abdominal hysterectomy) ICD-10-CM: Z90.710  ICD-9-CM: V88.01  7/21/2014        S/P cataract surgery ICD-10-CM: Z98.49  ICD-9-CM: V45.61  7/21/2014        Previous back surgery ICD-10-CM: Z98.890  ICD-9-CM: V45.89  7/21/2014                Melida Merritt returns to the [address] for management of cryptogenic cirrhosis. The active problem list, all pertinent past medical history, medications, liver histology, radiologic findings and laboratory findings related to the liver disorder were reviewed with the patient. The patient has not developed ascites. Mass behind left retina.   Is being seen at [facility] regarding this. Macular degeneration in right eye. Edema had resolved with step 1.5 diuretics, 60 mg lasix and 150 mg aldactone/day. The patient has not developed hepatic encephalopathy. Imaging of the liver was performed in 09/2018 with MRI/MRCP. Consistent with cirrhosis. No focal lesion identified. Shear wave elastography was performed in 09/2016, 05/2018, and again in 06/2019. This suggests cirrhosis and fatty liver. EGD in 9/2015 demonstrated normal esophagus. The EGD was repeated by Dr. Garry Li in 11/2017. This demonstrated simple esophageal varices and portal hypertensive gastropathy. Most recent EGD was performed by Dr. Ryley Munoz in 03/2018 and demonstrated small esophageal varices and mild portal hypertensive gastropathy of the body of the stomach. No gastric varices identified. The patient underwent a liver biopsy in 09/2014. Dr. Ryley Munoz has reviewed the biopsy slides. This demonstrated inactive cirrhosis with only minimal steatosis and no inflammation. The most recent laboratory studies indicate that the liver transaminases are elevated, alkaline phosphatase is elevated, tests of hepatic synthetic and metabolic function are depressed, and the platelet count is depressed. The patient has no complaints which can be attributed to liver disease. The patient denies fatigue since last visit. The patient completes all daily activities without any functional limitations. The patient has not experienced fevers, chills, shortness of breath, chest pain, pain in the right side over the liver, diffuse abdominal pain, nausea, vomiting, constipation, diarrhrea, dry eyes, dry mouth, arthralgias, myalgias, yellowing of the eyes or skin, itching, dark urine, problems concentrating, swelling of the abdomen, swelling of the lower extremities, hematemesis, or hematochezia.     ALLERGIES:  No Known Allergies    MEDICATIONS  Current Outpatient Medications   Medication Sig    insulin regular (NOVOLIN R, HUMULIN R) 100 unit/mL injection Inject up to 20 Units SQ BID. May be combined with NPH insulin in 1 syringe. Always draw this insulin into syringe first before the NPH.  denosumab (PROLIA) 60 mg/mL injection 60 mg by SubCUTAneous route.  brimonidine (ALPHAGAN P) 0.1 % ophthalmic solution 1 Drop two (2) times a day.  therapeutic multivitamin (THERAGRAN) tablet Take 1 Tab by mouth daily.  vitamin B complex capsule Take 1 Cap by mouth daily.  furosemide (LASIX) 40 mg tablet Take 1.5 Tabs by mouth daily.  spironolactone (ALDACTONE) 100 mg tablet Take 150 mg aldactone daily.  spironolactone (ALDACTONE) 50 mg tablet TAKE 150 MG ALDACTONE DAILY    losartan (COZAAR) 25 mg tablet Take  by mouth daily. Take a half tablet once a day.  insulin NPH (NOVOLIN N, HUMULIN N) 100 unit/mL injection 35 Units by SubCUTAneous route two (2) times a day.  levothyroxine (SYNTHROID) 100 mcg tablet Take 100 mcg by mouth Daily (before breakfast). Indications: HYPOTHYROIDISM     No current facility-administered medications for this visit. REVIEW OF SYSTEMS:  Systems related to all organ systems were reviewed. All were negative except as noted above. FAMILY HISTORY:  The father  of CVA. The mother  of cirrhosis. She did not consume alcohol. There are no other family members with liver disease. SOCIAL HISTORY:  The patient is . The patient has 4 children, and 8 grandchildren. The patient has never used tobacco products. The patient has never consumed significant amounts of alcohol. The patient currently works part time for a . PHYSICAL EXAMINATION:  Visit Vitals  /54   Pulse (!) 56   Temp 97.3 °F (36.3 °C) (Tympanic)   Wt 184 lb (83.5 kg)   SpO2 99%   BMI 31.10 kg/m²     General: No acute distress. Eyes: Sclera anicteric. ENT: No oral lesions. Thyroid normal.  Nodes: No adenopathy. Skin: No spider angiomata.   No jaundice. No palmar erythema. Respiratory: Lungs clear to auscultation. Cardiovascular: Regular heart rate. No murmurs. No JVD. Abdomen: Soft non-tender. Liver size normal to percussion/palpation. Spleen not palpable. Extremities: No extremity edema. No muscle wasting. No gross arthritic changes. Neurologic: Alert and oriented. Cranial nerves grossly intact. No asterixsis. LABORATORY STUDIES:  From 07/27/2020 [facility]). AST/ ALT/ ALP/ T. BILI/ ALB: 58/ 44/ 126/ 2.1/ 2.9  BUN/ CRT:  31/ 1.28  PLT:  78,000    Liver [place] [address] Ref Rng & Units 10/24/2019 4/25/2019   WBC 4.6 - 13.2 K/uL 6.0 6.6   ANC 1.8 - 8.0 K/UL 4.1 3.7   HGB 12.0 - 16.0 g/dL 13.3 14.0    - 420 K/uL 96 (L) 124 (L)   INR 0.8 - 1.2   1.1 1.1   AST 10 - 38 U/L 63 (H) 53 (H)   ALT 13 - 56 U/L 78 (H) 43   Alk Phos 45 - 117 U/L 168 (H) 140 (H)   Bili, Total 0.2 - 1.0 MG/DL 1.4 (H) 1.8 (H)   Bili, Direct 0.0 - 0.2 MG/DL 0.5 (H) 0.6 (H)   Albumin 3.4 - 5.0 g/dL 3.1 (L) 3.1 (L)   BUN 7.0 - 18 MG/DL 13 16   Creat 0.6 - 1.3 MG/DL 1.03 1.03   Na 136 - 145 mmol/L 141 139   K 3.5 - 5.5 mmol/L 3.9 3.9   Cl 100 - 111 mmol/L 105 100   CO2 21 - 32 mmol/L 32 30   Glucose 74 - 99 mg/dL 175 (H) 163 (H)     Cancer Screening Latest Ref Rng & Units 10/24/2019 4/25/2019 10/3/2018   AFP, Serum 0.0 - 8.0 ng/mL 6.5 5.9 5.3   AFP-L3% 0.0 - 9.9 % 6.6 7.1 8.7   CEA ng/mL   3.5     SEROLOGIES:  Serologies Latest Ref Rng 7/21/2014   Hep A Ab, Total Negative Positive (A)   Hep B Surface Ag Negative Negative   Hep B Core Ab, Total Negative Negative   Ferritin 15 - 150 ng/mL 62   Iron % Saturation 15 - 55 % 29   SONIA, IFA  Negative   ASMCA 0 - 19 Units 8   M2 Ab 0.0 - 20.0 Units 11.4   Ceruloplasmin 16.0 - 45.0 mg/dL 30.3   Alpha-1 antitrypsin level 90 - 200 mg/dL 199     7/2014. Anti-HBsurface negative, anti-HCV negative. LIVER HISTOLOGY:  8/2014. Slides reviewed by MLS. Cirrhosis with minimal steatosis and no inflammation.   Etiology most likely BOURNE.  09/2016. Shear wave elastography. E median is 16.77 kPa. This is highly suggestive of cirrhosis. The E Std is 4.76. This wide distrubution of data indicates fatty liver. 05/2018. TRANSIENT HEPATIC ELASTOGRAPHY:   E Range: 9.11-14.61 kPa  E Mean: 11.06 kPa  E Median: 11.54 kPa  E Std: 2.17 kPa     06/2019. TRANSIENT HEPATIC ELASTOGRAPHY:   E Range: 8.71 - 13.76 kPa  E Mean: 11.07 kPa  E Median: 10.91 kPa  E Std: 1.65 kPa     ENDOSCOPIC PROCEDURES:  9/2014. EGD by MLS. Small esophageal varices. No banding performed. Gastitis. H.Pylori negative. 9/2015. EGD by Dr. Kia Weinstein. Normal esophagus. Repeat in two years. 11/2017. EGD by Dr. Carrie Warren. Simple esophageal varices, portal hypertensive gastropathy. 03/2018. EGD by Dr. Kia Weinstein. Small esophageal varices. No banding performed. Repeat in one year. 11/2019. EGD by Dr. Kia Weinstein. A few medium esophageal varices were identified. Banding of esophageal varices was performed. RADIOLOGY:  6/2014. Ultrasound of liver. Echogenic consistent with cirrhosis. No liver mass lesions. No dilated bile ducts. No ascites. Recanalization of umbilical vein. 6/2014. CT scan abdomen with IV contrast.  Changes consistent with cirrhosis. No liver mass lesions. No dilated bile ducts. No ascites. 01/2015. Ultrasound of liver. Diffusely coarsened and echogenic hepatic echotexture. No focal lesion identified. 05/2015. Ultrasound of the liver. Compatible with cirrhosis. No focal hepatic lesion. 05/2015. CT of the liver. Cirrhotic appearing liver. No focal hepatic lesion. Findings in keeping with portal hypertension, no ascites. 11/2015. US of the liver. Small echogenic liver compatible with history of cirrhosis, with no new focal intrahepatic lesion. 09/2016. Ultrasound of the liver. Consistent with cirrhosis. No hepatic masses. 05/2017. Ultrasound of the liver.   Nodular hepatic contour with underlying parenchymal stigmata of cirrhosis. No focal hepatic lesion. 10/2017. Ultrasound of the liver. Heterogeneous increase in echogenicity without focal lesion. Possible recanalized umbilical vein. 05/2018. Ultrasound of the liver. Hepatic cirrhosis with recanalized umbilical vein. No focal hepatic mass. 06/2018. CT Abd/Pel w/contrast.  Cirrhosis with sequela of portal hypertension. This is manifested by splenomegaly, recannulized paraumbilical vein and gastroesophageal varices. 08/2018. CT Abd/Pel w/IV contrast only. Cirrhosis of the liver with portal venous hypertension. 09/2018. MRI/MRCP Abdomen w/wo contrast.  Abnormal hepatic morphology suggesting parenchymal disease/cirrhosis. No discrete hepatic mass is identified. Intra-abdominal varicosities are present. There is moderate splenic enlargement. Small cystic pancreatic masses are noted. 06/2019. Ultrasound of the liver. Heterogeneous internal echotexture. No focal mass. OTHER TESTING:  Not available or performed    ASSESSMENT AND PLAN:  Cryptogenic cirrhosis. This is likely secondary to BOURNE given DM and the finding of minimal steatosis on the liver biopsy. However, she does have positive autoimmune markers and manifests some autoimmune disorders, so this could be of resolving autoimmune etiology. The most recent laboratory studies indicate that the liver transaminases are normal, alkaline phosphatase is elevated, tests of hepatic synthetic and metabolic function are normal, and the platelet count is depressed. Complications of cirrhosis were discussed in detail. We discussed thrombocytopenia, portal hypertension, varices, GI bleeding, peripheral edema, ascites, hepatic encephalopathy, and hepatocellular carcinoma. We discussed the need for follow ups on a regular basis to monitor for complications. We discussed the need for every 6 month liver imaging studies.      MELD is 21, CPT is 7, Child's Savage Class B    EGD was performed in 11/2019 by Dr. Fabiola Shannon. The patient has medium esophageal varices which were banded X 2. Repeat EGD was ordered today. Hepatic encephalopathy has not developed to date. There is no need for treatment with lactulose and/or xifaxan at this time. The patient has developed minimal edema which was well controlled with low dose diuretics. Now on step 1.5 diuretics, lasix 60 mg/day and aldactone 150 mg/day. Creatinine is 1.28. Continue this dose. Salt restrictions discussed. The patient was directed to continue all current medications at the current dosages. There are no contraindications for the patient to take any medications that are necessary for treatment of other medical issues. The patient was counseled regarding alcohol consumption. Thrombocytopenia secondary to cirrhosis. There is no evidence of overt bleeding. There is no indication for platelet transfusions or pharmacologic treatment to increase the platelet count. Vaccination for viral hepatitis A is not needed. The patient has serologic evidence of prior exposure or vaccination with immunity. [address] screening has recently been performed and does not suggest [address] Repeat imaging is due now. Repeat ultrasound was ordered and will be scheduled. Shear wave elastography suggests that the cirrhosis is resolving with weight loss. Will perform fibroscan in the office annually to document hepatic fibrosis. Fibroscan can assess liver fibrosis and determine if a patient has advanced fibrosis or cirrhosis without the need for liver biopsy. 25 minutes total time spent with this patient with more than 50% of this time spent counseling and coordinating care as described above. [address] in 3 months.      River Doran NP   Liver [place] of [address] [place] [address], [address] [place] [address]  [phone]

## 2020-07-31 LAB
AFP L3 MFR SERPL: 7.8 % (ref 0–9.9)
AFP SERPL-MCNC: 4 NG/ML (ref 0–8)

## 2020-08-17 RX ORDER — SPIRONOLACTONE 100 MG/1
TABLET, FILM COATED ORAL
Qty: 90 TAB | Refills: 3 | Status: SHIPPED | OUTPATIENT
Start: 2020-08-17 | End: 2020-08-17 | Stop reason: SDUPTHER

## 2020-08-17 RX ORDER — FUROSEMIDE 40 MG/1
60 TABLET ORAL DAILY
Qty: 135 TAB | Refills: 3 | Status: SHIPPED | OUTPATIENT
Start: 2020-08-17 | End: 2020-08-17 | Stop reason: SDUPTHER

## 2020-08-17 RX ORDER — FUROSEMIDE 40 MG/1
60 TABLET ORAL DAILY
Qty: 135 TAB | Refills: 3 | Status: SHIPPED | OUTPATIENT
Start: 2020-08-17 | End: 2020-11-10 | Stop reason: DRUGHIGH

## 2020-08-17 RX ORDER — SPIRONOLACTONE 100 MG/1
TABLET, FILM COATED ORAL
Qty: 90 TAB | Refills: 3 | Status: SHIPPED | OUTPATIENT
Start: 2020-08-17 | End: 2020-11-10 | Stop reason: DRUGHIGH

## 2020-09-16 DIAGNOSIS — Z01.812 PRE-PROCEDURE LAB EXAM: Primary | ICD-10-CM

## 2020-09-17 ENCOUNTER — HOSPITAL ENCOUNTER (OUTPATIENT)
Dept: PREADMISSION TESTING | Age: 70
Discharge: HOME | End: 2020-09-17
Payer: MEDICARE

## 2020-09-17 PROCEDURE — 87635 SARS-COV-2 COVID-19 AMP PRB: CPT

## 2020-09-18 LAB — SARS-COV-2, COV2NT: NOT DETECTED

## 2020-09-22 ENCOUNTER — HOSPITAL ENCOUNTER (OUTPATIENT)
Age: 70
Setting detail: OUTPATIENT SURGERY
Discharge: HOME | End: 2020-09-22
Attending: INTERNAL MEDICINE | Admitting: INTERNAL MEDICINE
Payer: MEDICARE

## 2020-09-22 ENCOUNTER — ANESTHESIA EVENT (OUTPATIENT)
Dept: ENDOSCOPY | Age: 70
End: 2020-09-22
Payer: MEDICARE

## 2020-09-22 ENCOUNTER — ANESTHESIA (OUTPATIENT)
Dept: ENDOSCOPY | Age: 70
End: 2020-09-22
Payer: MEDICARE

## 2020-09-22 VITALS
WEIGHT: 198 LBS | HEART RATE: 51 BPM | SYSTOLIC BLOOD PRESSURE: 126 MMHG | BODY MASS INDEX: 33.8 KG/M2 | OXYGEN SATURATION: 100 % | DIASTOLIC BLOOD PRESSURE: 54 MMHG | TEMPERATURE: 97.1 F | HEIGHT: 64 IN | RESPIRATION RATE: 16 BRPM

## 2020-09-22 DIAGNOSIS — K31.89 PORTAL HYPERTENSIVE GASTROPATHY (HCC): ICD-10-CM

## 2020-09-22 DIAGNOSIS — K76.6 PORTAL HYPERTENSIVE GASTROPATHY (HCC): ICD-10-CM

## 2020-09-22 LAB — GLUCOSE BLD STRIP.AUTO-MCNC: 117 MG/DL (ref 70–110)

## 2020-09-22 PROCEDURE — 82962 GLUCOSE BLOOD TEST: CPT

## 2020-09-22 PROCEDURE — 74011250636 HC RX REV CODE- 250/636: Performed by: ANESTHESIOLOGY

## 2020-09-22 PROCEDURE — 76040000019: Performed by: INTERNAL MEDICINE

## 2020-09-22 PROCEDURE — 74011250636 HC RX REV CODE- 250/636: Performed by: INTERNAL MEDICINE

## 2020-09-22 PROCEDURE — 2709999900 HC NON-CHARGEABLE SUPPLY: Performed by: INTERNAL MEDICINE

## 2020-09-22 PROCEDURE — 43235 EGD DIAGNOSTIC BRUSH WASH: CPT | Performed by: INTERNAL MEDICINE

## 2020-09-22 PROCEDURE — 76060000031 HC ANESTHESIA FIRST 0.5 HR: Performed by: INTERNAL MEDICINE

## 2020-09-22 RX ORDER — SODIUM CHLORIDE 9 MG/ML
150 INJECTION, SOLUTION INTRAVENOUS CONTINUOUS
Status: DISCONTINUED | OUTPATIENT
Start: 2020-09-22 | End: 2020-09-22 | Stop reason: HOSPADM

## 2020-09-22 RX ORDER — HEPARIN SODIUM 1000 [USP'U]/ML
5000 INJECTION, SOLUTION INTRAVENOUS; SUBCUTANEOUS ONCE
Status: COMPLETED | OUTPATIENT
Start: 2020-09-22 | End: 2020-09-22

## 2020-09-22 RX ORDER — PROPOFOL 10 MG/ML
INJECTION, EMULSION INTRAVENOUS AS NEEDED
Status: DISCONTINUED | OUTPATIENT
Start: 2020-09-22 | End: 2020-09-22 | Stop reason: HOSPADM

## 2020-09-22 RX ADMIN — HEPARIN SODIUM 5000 UNITS: 1000 INJECTION INTRAVENOUS; SUBCUTANEOUS at 11:50

## 2020-09-22 RX ADMIN — SODIUM CHLORIDE: 900 INJECTION, SOLUTION INTRAVENOUS at 11:14

## 2020-09-22 RX ADMIN — PROPOFOL 50 MG: 10 INJECTION, EMULSION INTRAVENOUS at 11:21

## 2020-09-22 RX ADMIN — SODIUM CHLORIDE 150 ML/HR: 900 INJECTION, SOLUTION INTRAVENOUS at 11:01

## 2020-09-22 RX ADMIN — PROPOFOL 50 MG: 10 INJECTION, EMULSION INTRAVENOUS at 11:17

## 2020-09-22 NOTE — ANESTHESIA POSTPROCEDURE EVALUATION
Procedure(s):  EGD WITH MAC.     MAC    Anesthesia Post Evaluation      Multimodal analgesia: multimodal analgesia used between 6 hours prior to anesthesia start to PACU discharge  Patient location during evaluation: PACU  Patient participation: complete - patient participated  Level of consciousness: awake  Pain management: adequate  Airway patency: patent  Anesthetic complications: no  Cardiovascular status: acceptable  Respiratory status: acceptable  Hydration status: acceptable  Post anesthesia nausea and vomiting:  none  Final Post Anesthesia Temperature Assessment:  Normothermia (36.0-37.5 degrees C)      INITIAL Post-op Vital signs:   Vitals Value Taken Time   /54 9/22/2020 11:23 AM   Temp     Pulse 48 9/22/2020 11:23 AM   Resp 8 9/22/2020 11:23 AM   SpO2 100 % 9/22/2020 11:23 AM

## 2020-09-22 NOTE — ANESTHESIA PREPROCEDURE EVALUATION
Relevant Problems   No relevant active problems       Anesthetic History   No history of anesthetic complications            Review of Systems / Medical History  Patient summary reviewed, nursing notes reviewed and pertinent labs reviewed    Pulmonary  Within defined limits                 Neuro/Psych   Within defined limits           Cardiovascular                  Exercise tolerance: >4 METS     GI/Hepatic/Renal           Liver disease     Endo/Other    Diabetes  Hypothyroidism  Arthritis     Other Findings              Physical Exam    Airway  Mallampati: II  TM Distance: 4 - 6 cm  Neck ROM: normal range of motion   Mouth opening: Normal     Cardiovascular  Regular rate and rhythm,  S1 and S2 normal,  no murmur, click, rub, or gallop             Dental  No notable dental hx       Pulmonary                 Abdominal  GI exam deferred       Other Findings            Anesthetic Plan    ASA: 3  Anesthesia type: MAC            Anesthetic plan and risks discussed with: Patient

## 2020-09-22 NOTE — PROCEDURES
3340 [facility], MD, [address], Cite Onesimo Cummings MD Deirdre Boot, VIVI Campbell, [facility]     Esme TOLENTINO Yves, [facility]   Isabel Jorge, [facility]    Rossy Castillo, [facility]       [facility] 136    at [address], [facility] Susannah Benítez  22.    [phone]    FAX: [address] [place] [address], [address]    [phone]    FAX: [phone]         UPPER ENDOSCOPY PROCEDURE NOTE    Quenton Essex  1950    INDICATION: Cirrhosis. Screening for esophageal varices with variceal ligation if  indicated. : Steven Marshall MD    SURGICAL ASSISTANT:  None    PROSTHETIC DEVISES, TISSUE GRAFTS, ORGAN TRANSPLANTS:  Not applicable     ANESTHESIA/SEDATION: Sedation per anesthesiology    PROCEDURE DESCRIPTION:  [employer] consent was obtained from the patient for the procedure. All risks and benefits of the procedure explained. The procedure was performed in the endoscopy suite. The patient was laying on a stretcher and moved to the left lateral decubitus position prior to administration of sedation. Sedation was administered by anesthesiology. See their note for details. The endoscope was inserted into the mouth and advanced under direct vision to the second portion of the duodenum. Careful inspection of upper gastrointestinal tract was made as the endoscope was inserted and withdrawn. Retroflexion of the endoscope to view of the cardia of the stomach was performed. The findings and interventions are described below. FINDINGS:  Esophagus:    Normal.      Stomach: Moderate Severe portal hypertensive gastropathy of the body of the stomach  No gastric varices identified.     Duodenum: Normal bulb and second portion    INTERVENTION:   None    COMPLICATIONS: None. The patient tolerated the procedure well. EBL: Negligible. RECOMMENDATIONS:  Observe until discharge parameters are achieved. May resume previous diet. Repeat endoscopy to reassess varices and need for banding in 2 years. Follow-up [facility] [place] [facility] [place] office as scheduled.       Eva Durham MD  [address]  4 [facility], [address] [place] [address] Sona De La Torre, [address]  12 [facility]

## 2020-09-22 NOTE — H&P
3340 [facility], MD, [address], Cite Eugene Jones, Jeri Fleeting, MD Danita Holter, VIVI Gardner, [facility]E Marinelli [facility]   SISI Davis, [facility]       [facility], [facility] 136    at [address], [address] Susannah Benítez  22.    [phone]    FAX: [address] [place] [address], [address]    [phone]    FAX: [phone]         PRE-PROCEDURE NOTE - EGD    H and P from last office visit reviewed. Allergies reviewed. Out-patient medicaton list reviewed. Patient Active Problem List   Diagnosis Code    Diabetes mellitus ([facility] E11.9    Hypothyroidism E03.9    S/P JAYY (total abdominal hysterectomy) Z90.710    S/P cataract surgery Z98.49    Previous back surgery Z98.890    [facility], [facility] K74.69    Type 2 diabetes with nephropathy ([facility] E11.21       No Known Allergies    No current facility-administered medications on file prior to encounter. Current Outpatient Medications on File Prior to Encounter   Medication Sig Dispense Refill    brimonidine (ALPHAGAN P) 0.1 % ophthalmic solution 1 Drop two (2) times a day.  insulin regular (NOVOLIN R, HUMULIN R) 100 unit/mL injection Inject up to 20 Units SQ BID. May be combined with NPH insulin in 1 syringe. Always draw this insulin into syringe first before the NPH.  vitamin B complex capsule Take 1 Cap by mouth daily.  spironolactone (ALDACTONE) 50 mg tablet TAKE 150 MG ALDACTONE DAILY 90 Tab 3    losartan (COZAAR) 25 mg tablet Take  by mouth daily. Take a half tablet once a day.  insulin NPH (NOVOLIN N, HUMULIN N) 100 unit/mL injection 35 Units by SubCUTAneous route two (2) times a day.       levothyroxine (SYNTHROID) 100 mcg tablet Take 100 mcg by mouth Daily (before breakfast). Indications: HYPOTHYROIDISM      therapeutic multivitamin (THERAGRAN) tablet Take 1 Tab by mouth daily.  denosumab (PROLIA) 60 mg/mL injection 60 mg by SubCUTAneous route. For EGD to assess for esophageal and gastric varices. Plan to perform banding if indicated based upon variceal size and appearance. The risks of the procedure were discussed with the patient. These included reaction to anesthesia, pain, perforation and bleeding. All questions were answered. The patient wishes to proceed with the procedure. The patient was counseled at length about the risks of anusha Covid-19 in the fady-operative and post-operative states including the recovery window of their procedure. The patient was made aware that anusha Covid-19 after a surgical procedure may worsen their prognosis for recovering from the virus and lend to a higher morbidity and or mortality risk. The patient was given the options of postponing their procedure. All of the risks, benefits, and alternatives were discussed. The patient does  wish to proceed with the procedure. PHYSICAL EXAMINATION:  VS: per nursing note  General: No acute distress. Eyes: Sclera anicteric. ENT: No oral lesions. Thyroid normal.  Nodes: No adenopathy. Skin: No spider angiomata. No jaundice. No palmar erythema. Respiratory: Lungs clear to auscultation. Cardiovascular: Regular heart rate. No murmurs. No JVD. Abdomen: Soft non-tender, liver size normal to percussion/palpation. Spleen not palpable. No obvious ascites. Extremities: No edema. No muscle wasting. No gross arthritic changes. Neurologic: Alert and oriented. Cranial nerves grossly intact. No asterixis.       MOST RECENT LABORATORY STUDIES:  Lab Results   Component Value Date/Time    WBC 6.0 10/24/2019 10:24 AM    HGB 13.3 10/24/2019 10:24 AM    HCT 40.3 10/24/2019 10:24 AM PLATELET 96 (L) 30/43/0458 10:24 AM    .0 (H) 10/24/2019 10:24 AM     Lab Results   Component Value Date/Time    INR 1.1 10/24/2019 10:24 AM    INR 1.1 04/25/2019 11:23 AM    INR 1.1 10/03/2018 02:24 PM    Prothrombin time 14.4 10/24/2019 10:24 AM    Prothrombin time 13.5 04/25/2019 11:23 AM    Prothrombin time 13.4 10/03/2018 02:24 PM       ASSESSMENT AND PLAN:  EGD to assess for esophageal and/or gastric varices.   Sedation per anesthesiology      MD Nina Orosco 13 of [address] [facility] 58, [address]  [phone]  [address] [place] [address]

## 2020-09-22 NOTE — DISCHARGE INSTRUCTIONS
[address], MD, [place], Cite Eugene Jones, MD Tariq Martin, VIVI Salas, [facility]     Esme TOLENTINO Yves, [facility]   Vic Guerra CHERRY-CALE Ellis, [facility]       Paulie Bryant Fransico De Herrera 136    at [address], [address] [place] [address], [facility] 22.    [phone]    FAX: [address] [place] [address], [address]    [phone]    FAX: [phone]         ENDOSCOPY DISCHARGE INSTRUCTIONS      Thelma Poonam  1950  Date: 9/22/2020    DISCOMFORT:  Use lozenges or warm salt water gargle for sore thoat  Apply warm compress to IV site if red. If redness or soreness persists call the office. You may experience gas and bloating. Walking and belching will help relieve this. You may experience chest discomfort or pressure especially if banding of esophageal varices was performed. DIET:  You may resume your normal diet. ACTIVITY:  Spend the remainder of the day resting. Avoid any strenuous activity. You may not operate a vehicle for 12 hours. You may not engage in an occupation involving machinery or appliances for rest of today. Avoid making any critical or financial decisions for at least 24 hour. Call the [address] [employer] [facility] if you have any of the following:  Increasing chest or abdominal pain, nausea, vomiting, vomiting blood, abdominal distension or swelling, fever or chills, bloody discharge from nose or mouth or shortness of breath. Follow-up Instructions:  Call Dr. Marcy Patel for any questions or problems at the phone number listed above. If a biopsy was performed, you will be contacted by the office staff or Dr Marcy Patel within 1 week.    If you have not heard from us by then you may call the office at the phone number listed above to inquire about the results. ENDOSCOPY FINDINGS:  Your endoscopy demonstrated mild swelling of the stomach. Will repeat EGD to look for development of varices in 2 years. DISCHARGE SUMMARY from the Nurse: The following personal items collected during your admission are returned to you:   Dental Appliance: Dental Appliances: None  Vision: Visual Aid: Glasses(reading)  Hearing Aid:    Jewelry:    Clothing:    Other Valuables:    Valuables sent to safe:                          Learning About Coronavirus (COVID-19)  Coronavirus (COVID-19): Overview  What is coronavirus (NSCDA-11)? The coronavirus disease (COVID-19) is caused by a virus. It is an illness that was first found in [place], [place], in December 2019. It has since spread worldwide. The virus can cause fever, cough, and trouble breathing. In severe cases, it can cause pneumonia and make it hard to breathe without help. It can cause death. Coronaviruses are a large group of viruses. They cause the common cold. They also cause more serious illnesses like Middle East respiratory syndrome (MERS) and severe acute respiratory syndrome (SARS). COVID-19 is caused by a novel coronavirus. That means it's a new type that has not been seen in people before. This virus spreads person-to-person through droplets from coughing and sneezing. It can also spread when you are close to someone who is infected. And it can spread when you touch something that has the virus on it, such as a doorknob or a tabletop. What can you do to protect yourself from coronavirus (COVID-19)? The best way to protect yourself from getting sick is to:  · Avoid areas where there is an outbreak. · Avoid contact with people who may be infected. · Wash your hands often with soap or alcohol-based hand sanitizers. · Avoid crowds and try to stay at least 6 feet away from other people.   · Wash your hands often, especially after you cough or sneeze. Use soap and water, and scrub for at least 20 seconds. If soap and water aren't available, use an alcohol-based hand . · Avoid touching your mouth, nose, and eyes. What can you do to avoid spreading the virus to others? To help avoid spreading the virus to others:  · Cover your mouth with a tissue when you cough or sneeze. Then throw the tissue in the trash. · Use a disinfectant to clean things that you touch often. · Stay home if you are sick or have been exposed to the virus. Don't go to school, work, or public areas. And don't use public transportation. · If you are sick:  ? Leave your home only if you need to get medical care. But call the doctor's office first so they know you're coming. And wear a face mask, if you have one.  ? If you have a face mask, wear it whenever you're around other people. It can help stop the spread of the virus when you cough or sneeze. ? Clean and disinfect your home every day. Use household  and disinfectant wipes or sprays. Take special care to clean things that you grab with your hands. These include doorknobs, remote controls, phones, and handles on your refrigerator and microwave. And don't forget countertops, tabletops, bathrooms, and computer keyboards. When to call for help  Call 911 anytime you think you may need emergency care. For example, call if:  · You have severe trouble breathing. (You can't talk at all.)  · You have constant chest pain or pressure. · You are severely dizzy or lightheaded. · You are confused or can't think clearly. · Your face and lips have a blue color. · You pass out (lose consciousness) or are very hard to wake up. Call your doctor now if you develop symptoms such as:  · Shortness of breath. · Fever. · Cough. If you need to get care, call ahead to the doctor's office for instructions before you go. Make sure you wear a face mask, if you have one, to prevent exposing other people to the virus.   Where can you get the latest information? The following health organizations are tracking and studying this virus. Their websites contain the most up-to-date information. Joel Allan also learn what to do if you think you may have been exposed to the virus. · U.S. Centers for Disease Control and Prevention (CDC): The CDC provides updated news about the disease and travel advice. The website also tells you how to prevent the spread of infection. www.cdc.gov  · World Health Organization [facility]): WHO offers information about the virus outbreaks. WHO also has travel advice. www.who.int  Current as of: April 1, 2020               Content Version: 12.4  © 6273-0438 [employer]. Care instructions adapted under license by your healthcare professional. If you have questions about a medical condition or this instruction, always ask your healthcare professional. [address] any warranty or liability for your use of this information. DISCHARGE SUMMARY from Nurse    PATIENT INSTRUCTIONS:    After general anesthesia or intravenous sedation, for 24 hours or while taking prescription Narcotics:  · Limit your activities  · Do not drive and operate hazardous machinery  · Do not make important personal or business decisions  · Do  not drink alcoholic beverages  · If you have not urinated within 8 hours after discharge, please contact your surgeon on call. Report the following to your surgeon:  · Excessive pain, swelling, redness or odor of or around the surgical area  · Temperature over 100.5  · Nausea and vomiting lasting longer than 4 hours or if unable to take medications  · Any signs of decreased circulation or nerve impairment to extremity: change in color, persistent  numbness, tingling, coldness or increase pain  · Any questions    What to do at Home:  Recommended activity: Activity as tolerated,      *  Please give a list of your current medications to your Primary Care Provider.     *  Please update this list whenever your medications are discontinued, doses are      changed, or new medications (including over-the-counter products) are added. *  Please carry medication information at all times in case of emergency situations. These are general instructions for a healthy lifestyle:    No smoking/ No tobacco products/ Avoid exposure to second hand smoke  Surgeon General's Warning:  Quitting smoking now greatly reduces serious risk to your health. Obesity, smoking, and sedentary lifestyle greatly increases your risk for illness    A healthy diet, regular physical exercise & weight monitoring are important for maintaining a healthy lifestyle    You may be retaining fluid if you have a history of heart failure or if you experience any of the following symptoms:  Weight gain of 3 pounds or more overnight or 5 pounds in a week, increased swelling in our hands or feet or shortness of breath while lying flat in bed. Please call your doctor as soon as you notice any of these symptoms; do not wait until your next office visit. The discharge information has been reviewed with the patient and spouse. The patient and spouse verbalized understanding. Discharge medications reviewed with the patient and spouse and appropriate educational materials and side effects teaching were provided.   _________________________________________________________________________________________________________________________________Patient armband removed and shredded  __

## 2020-11-10 ENCOUNTER — OFFICE VISIT (OUTPATIENT)
Dept: HEMATOLOGY | Age: 70
End: 2020-11-10
Payer: MEDICARE

## 2020-11-10 VITALS
HEIGHT: 64 IN | RESPIRATION RATE: 18 BRPM | TEMPERATURE: 97.6 F | SYSTOLIC BLOOD PRESSURE: 155 MMHG | WEIGHT: 205 LBS | OXYGEN SATURATION: 98 % | BODY MASS INDEX: 35 KG/M2 | HEART RATE: 60 BPM | DIASTOLIC BLOOD PRESSURE: 58 MMHG

## 2020-11-10 DIAGNOSIS — K74.69 CIRRHOSIS, CRYPTOGENIC (HCC): Primary | ICD-10-CM

## 2020-11-10 PROCEDURE — 3017F COLORECTAL CA SCREEN DOC REV: CPT | Performed by: NURSE PRACTITIONER

## 2020-11-10 PROCEDURE — 1090F PRES/ABSN URINE INCON ASSESS: CPT | Performed by: NURSE PRACTITIONER

## 2020-11-10 PROCEDURE — 1101F PT FALLS ASSESS-DOCD LE1/YR: CPT | Performed by: NURSE PRACTITIONER

## 2020-11-10 PROCEDURE — G8417 CALC BMI ABV UP PARAM F/U: HCPCS | Performed by: NURSE PRACTITIONER

## 2020-11-10 PROCEDURE — G8427 DOCREV CUR MEDS BY ELIG CLIN: HCPCS | Performed by: NURSE PRACTITIONER

## 2020-11-10 PROCEDURE — G8400 PT W/DXA NO RESULTS DOC: HCPCS | Performed by: NURSE PRACTITIONER

## 2020-11-10 PROCEDURE — G0463 HOSPITAL OUTPT CLINIC VISIT: HCPCS | Performed by: NURSE PRACTITIONER

## 2020-11-10 PROCEDURE — G8432 DEP SCR NOT DOC, RNG: HCPCS | Performed by: NURSE PRACTITIONER

## 2020-11-10 PROCEDURE — G8536 NO DOC ELDER MAL SCRN: HCPCS | Performed by: NURSE PRACTITIONER

## 2020-11-10 PROCEDURE — 99214 OFFICE O/P EST MOD 30 MIN: CPT | Performed by: NURSE PRACTITIONER

## 2020-11-10 RX ORDER — ORPHENADRINE CITRATE 100 MG/1
100 TABLET, EXTENDED RELEASE ORAL 2 TIMES DAILY
Qty: 180 TAB | Refills: 1 | Status: SHIPPED | OUTPATIENT
Start: 2020-11-10 | End: 2021-03-03 | Stop reason: SDUPTHER

## 2020-11-10 RX ORDER — FUROSEMIDE 80 MG/1
80 TABLET ORAL DAILY
Qty: 90 TAB | Refills: 3 | Status: SHIPPED | OUTPATIENT
Start: 2020-11-10

## 2020-11-10 RX ORDER — SPIRONOLACTONE 100 MG/1
200 TABLET, FILM COATED ORAL DAILY
Qty: 180 TAB | Refills: 3 | Status: SHIPPED | OUTPATIENT
Start: 2020-11-10

## 2020-11-10 RX ORDER — ORPHENADRINE CITRATE 100 MG/1
100 TABLET, EXTENDED RELEASE ORAL 2 TIMES DAILY
COMMUNITY
End: 2020-11-10 | Stop reason: SDUPTHER

## 2020-11-10 NOTE — PROGRESS NOTES
Fabiola Hughes MD, Pearlean Cogan, Daun Hiss, MD Daved Guile, PA-CALE Martines, ACN-BC     Esme Marinelli, [facility]-BC   Yin Weaver FNP-C    Lisbet Bowers, [facility]       Paulie Deputado [address]    at [address], [address] [place] [address], [facility] 22.    [phone]    FAX: [address] [place] [address]    at [address], [address]    [phone]    FAX: [phone]       Patient Care Team:  Jose Davidson NP as PCP - General (Nurse Practitioner)  Andres Steel MD (Internal Medicine)  Jose Moncada MD (Ophthalmology)  Rachele Arredondo MD as Consulting Provider  Carlos Trejo MD (Hospitalist)  Rudie Harada, NP (Family Medicine)  Rosa Navarrete MD (Ophthalmology)      Problem List  Date Reviewed: 9/22/2020          Codes Class Noted    Type 2 diabetes with nephropathy [facility]) ICD-10-CM: E11.21  ICD-9-CM: 250.40, 583.81  4/19/2018        Cirrhosis, cryptogenic ([facility].) ICD-10-CM: K74.69  ICD-9-CM: 571.5  7/26/2014        Diabetes mellitus ([facility].) ICD-10-CM: E11.9  ICD-9-CM: 250.00  7/21/2014        Hypothyroidism ICD-10-CM: E03.9  ICD-9-CM: 244.9  7/21/2014        S/P JAYY (total abdominal hysterectomy) ICD-10-CM: Z90.710  ICD-9-CM: V88.01  7/21/2014        S/P cataract surgery ICD-10-CM: Z98.49  ICD-9-CM: V45.61  7/21/2014        Previous back surgery ICD-10-CM: Z98.890  ICD-9-CM: V45.89  7/21/2014                Jodi Villarreal returns to the [address] for management of cryptogenic cirrhosis. The active problem list, all pertinent past medical history, medications, liver histology, radiologic findings and laboratory findings related to the liver disorder were reviewed with the patient.       The patient has not developed ascites. Mass behind left retina. Is being seen at [facility] regarding this. Macular degeneration in right eye. Edema had resolved with step 1.5 diuretics, 60 mg lasix and 150 mg aldactone/day but is again manifest.      The patient has not developed hepatic encephalopathy. Imaging of the liver was performed in 09/2018 with MRI/MRCP. Consistent with cirrhosis. No focal lesion identified. Shear wave elastography was performed in 09/2016, 05/2018, and again in 06/2019. This suggests cirrhosis and fatty liver. The patient underwent a liver biopsy in 09/2014. Dr. Zoey Marino has reviewed the biopsy slides. This demonstrated inactive cirrhosis with only minimal steatosis and no inflammation. The most recent laboratory studies indicate that the liver transaminases are elevated, alkaline phosphatase is elevated, tests of hepatic synthetic and metabolic function are depressed, and the platelet count is depressed. The patient has no complaints which can be attributed to liver disease. The patient denies fatigue since last visit. The patient completes all daily activities without any functional limitations. The patient has not experienced fevers, chills, shortness of breath, chest pain, pain in the right side over the liver, diffuse abdominal pain, nausea, vomiting, constipation, diarrhrea, dry eyes, dry mouth, arthralgias, myalgias, yellowing of the eyes or skin, itching, dark urine, problems concentrating, swelling of the abdomen, swelling of the lower extremities, hematemesis, or hematochezia. ALLERGIES:  No Known Allergies    MEDICATIONS  Current Outpatient Medications   Medication Sig    spironolactone (Aldactone) 100 mg tablet Take 150 mg aldactone daily.  furosemide (LASIX) 40 mg tablet Take 1.5 Tabs by mouth daily.  brimonidine (ALPHAGAN P) 0.1 % ophthalmic solution 1 Drop two (2) times a day.     insulin regular (NOVOLIN R, HUMULIN R) 100 unit/mL injection Inject up to 20 Units SQ BID. May be combined with NPH insulin in 1 syringe. Always draw this insulin into syringe first before the NPH.  therapeutic multivitamin (THERAGRAN) tablet Take 1 Tab by mouth daily.  vitamin B complex capsule Take 1 Cap by mouth daily.  denosumab (PROLIA) 60 mg/mL injection 60 mg by SubCUTAneous route.  spironolactone (ALDACTONE) 50 mg tablet TAKE 150 MG ALDACTONE DAILY    losartan (COZAAR) 25 mg tablet Take  by mouth daily. Take a half tablet once a day.  insulin NPH (NOVOLIN N, HUMULIN N) 100 unit/mL injection 35 Units by SubCUTAneous route two (2) times a day.  levothyroxine (SYNTHROID) 100 mcg tablet Take 100 mcg by mouth Daily (before breakfast). Indications: HYPOTHYROIDISM     No current facility-administered medications for this visit. REVIEW OF SYSTEMS:  Systems related to all organ systems were reviewed. All were negative except as noted above. FAMILY HISTORY:  The father  of CVA. The mother  of cirrhosis. She did not consume alcohol. There are no other family members with liver disease. SOCIAL HISTORY:  The patient is . The patient has 4 children, and 8 grandchildren. The patient has never used tobacco products. The patient has never consumed significant amounts of alcohol. The patient currently works part time for a . PHYSICAL EXAMINATION:  Visit Vitals  BP (!) 155/58 (BP 1 Location: Right arm, BP Patient Position: Sitting)   Pulse 60   Temp 97.6 °F (36.4 °C)   Resp 18   Ht 5' 4\" (1.626 m)   Wt 205 lb (93 kg)   SpO2 98%   BMI 35.19 kg/m²     General: No acute distress. Eyes: Sclera anicteric. ENT: No oral lesions. Thyroid normal.  Nodes: No adenopathy. Skin: No spider angiomata. No jaundice. No palmar erythema. Respiratory: Lungs clear to auscultation. Cardiovascular: Regular heart rate. No murmurs. No JVD. Abdomen: Soft non-tender. Liver size normal to percussion/palpation.   Spleen not palpable. Extremities: No extremity edema. No muscle wasting. No gross arthritic changes. Neurologic: Alert and oriented. Cranial nerves grossly intact. No asterixsis. LABORATORY STUDIES:  From 07/27/2020 [facility]). AST/ ALT/ ALP/ T. BILI/ ALB: 58/ 44/ 126/ 2.1/ 2.9  BUN/ CRT:  31/ 1.28  PLT:  78,000    Liver [place] [address] Ref Rng & Units 10/24/2019 4/25/2019   WBC 4.6 - 13.2 K/uL 6.0 6.6   ANC 1.8 - 8.0 K/UL 4.1 3.7   HGB 12.0 - 16.0 g/dL 13.3 14.0    - 420 K/uL 96 (L) 124 (L)   INR 0.8 - 1.2   1.1 1.1   AST 10 - 38 U/L 63 (H) 53 (H)   ALT 13 - 56 U/L 78 (H) 43   Alk Phos 45 - 117 U/L 168 (H) 140 (H)   Bili, Total 0.2 - 1.0 MG/DL 1.4 (H) 1.8 (H)   Bili, Direct 0.0 - 0.2 MG/DL 0.5 (H) 0.6 (H)   Albumin 3.4 - 5.0 g/dL 3.1 (L) 3.1 (L)   BUN 7.0 - 18 MG/DL 13 16   Creat 0.6 - 1.3 MG/DL 1.03 1.03   Na 136 - 145 mmol/L 141 139   K 3.5 - 5.5 mmol/L 3.9 3.9   Cl 100 - 111 mmol/L 105 100   CO2 21 - 32 mmol/L 32 30   Glucose 74 - 99 mg/dL 175 (H) 163 (H)     Cancer Screening Latest Ref Rng & Units 10/24/2019 4/25/2019 10/3/2018   AFP, Serum 0.0 - 8.0 ng/mL 6.5 5.9 5.3   AFP-L3% 0.0 - 9.9 % 6.6 7.1 8.7   CEA ng/mL   3.5     SEROLOGIES:  Serologies Latest Ref Rng 7/21/2014   Hep A Ab, Total Negative Positive (A)   Hep B Surface Ag Negative Negative   Hep B Core Ab, Total Negative Negative   Ferritin 15 - 150 ng/mL 62   Iron % Saturation 15 - 55 % 29   SONIA, IFA  Negative   ASMCA 0 - 19 Units 8   M2 Ab 0.0 - 20.0 Units 11.4   Ceruloplasmin 16.0 - 45.0 mg/dL 30.3   Alpha-1 antitrypsin level 90 - 200 mg/dL 199     7/2014. Anti-HBsurface negative, anti-HCV negative. LIVER HISTOLOGY:  8/2014. Slides reviewed by MLS. Cirrhosis with minimal steatosis and no inflammation. Etiology most likely BOURNE.  09/2016. Shear wave elastography. E median is 16.77 kPa. This is highly suggestive of cirrhosis. The E Std is 4.76. This wide distrubution of data indicates fatty liver. 05/2018.  TRANSIENT HEPATIC ELASTOGRAPHY:   E Range: 9.11-14.61 kPa  E Mean: 11.06 kPa  E Median: 11.54 kPa  E Std: 2.17 kPa     06/2019. TRANSIENT HEPATIC ELASTOGRAPHY:   E Range: 8.71 - 13.76 kPa  E Mean: 11.07 kPa  E Median: 10.91 kPa  E Std: 1.65 kPa     ENDOSCOPIC PROCEDURES:  9/2014. EGD by MLS. Small esophageal varices. No banding performed. Gastitis. H.Pylori negative. 9/2015. EGD by Dr. Bev Roberts. Normal esophagus. Repeat in two years. 11/2017. EGD by Dr. Ciera Goode. Simple esophageal varices, portal hypertensive gastropathy. 03/2018. EGD by Dr. Bev Roberts. Small esophageal varices. No banding performed. Repeat in one year. 11/2019. EGD by Dr. Bev Roberts. A few medium esophageal varices were identified. Banding of esophageal varices was performed. 09/2020. EGD by Dr. Bev Roberts. Esophagus is normal.  Repeat EGD in 2 years. RADIOLOGY:  6/2014. Ultrasound of liver. Echogenic consistent with cirrhosis. No liver mass lesions. No dilated bile ducts. No ascites. Recanalization of umbilical vein. 6/2014. CT scan abdomen with IV contrast.  Changes consistent with cirrhosis. No liver mass lesions. No dilated bile ducts. No ascites. 01/2015. Ultrasound of liver. Diffusely coarsened and echogenic hepatic echotexture. No focal lesion identified. 05/2015. Ultrasound of the liver. Compatible with cirrhosis. No focal hepatic lesion. 05/2015. CT of the liver. Cirrhotic appearing liver. No focal hepatic lesion. Findings in keeping with portal hypertension, no ascites. 11/2015. US of the liver. Small echogenic liver compatible with history of cirrhosis, with no new focal intrahepatic lesion. 09/2016. Ultrasound of the liver. Consistent with cirrhosis. No hepatic masses. 05/2017. Ultrasound of the liver. Nodular hepatic contour with underlying parenchymal stigmata of cirrhosis. No focal hepatic lesion. 10/2017. Ultrasound of the liver.  Heterogeneous increase in echogenicity without focal lesion. Possible recanalized umbilical vein. 05/2018. Ultrasound of the liver. Hepatic cirrhosis with recanalized umbilical vein. No focal hepatic mass. 06/2018. CT Abd/Pel w/contrast.  Cirrhosis with sequela of portal hypertension. This is manifested by splenomegaly, recannulized paraumbilical vein and gastroesophageal varices. 08/2018. CT Abd/Pel w/IV contrast only. Cirrhosis of the liver with portal venous hypertension. 09/2018. MRI/MRCP Abdomen w/wo contrast.  Abnormal hepatic morphology suggesting parenchymal disease/cirrhosis. No discrete hepatic mass is identified. Intra-abdominal varicosities are present. There is moderate splenic enlargement. Small cystic pancreatic masses are noted. 06/2019. Ultrasound of the liver. Heterogeneous internal echotexture. No focal mass. 07/2020. Ultrasound of the liver. Diffusely heterogeneous in echogenicity. Lobular liver contour. Recannulized umbilical vein present. No solid mass. OTHER TESTING:  Not available or performed    ASSESSMENT AND PLAN:  Cryptogenic cirrhosis. This is likely secondary to BOURNE given DM and the finding of minimal steatosis on the liver biopsy. However, she does have positive autoimmune markers and manifests some autoimmune disorders, so this could be of resolving autoimmune etiology. The most recent laboratory studies indicate that the liver transaminases are normal, alkaline phosphatase is elevated, tests of hepatic synthetic and metabolic function are normal, and the platelet count is depressed. Complications of cirrhosis were discussed in detail. We discussed thrombocytopenia, portal hypertension, varices, GI bleeding, peripheral edema, ascites, hepatic encephalopathy, and hepatocellular carcinoma. We discussed the need for follow ups on a regular basis to monitor for complications. We discussed the need for every 6 month liver imaging studies.      MELD is 21, CPT is 7, Child's Savage Class B    EGD was performed in 11/2019 by Dr. Zoey Marino. The patient has medium esophageal varices which were banded X 2. Repeat EGD was ordered today. Hepatic encephalopathy has not developed to date. There is no need for treatment with lactulose and/or xifaxan at this time. The patient has developed minimal edema which was well controlled with low dose diuretics. Now on step 1.5 diuretics, lasix 60 mg/day and aldactone 150 mg/day. Creatinine is 1.2. Increased to step 2 diuretics today. Salt restrictions discussed. The patient was directed to continue all current medications at the current dosages. There are no contraindications for the patient to take any medications that are necessary for treatment of other medical issues. The patient was counseled regarding alcohol consumption. Thrombocytopenia secondary to cirrhosis. There is no evidence of overt bleeding. There is no indication for platelet transfusions or pharmacologic treatment to increase the platelet count. Vaccination for viral hepatitis A is not needed. The patient has serologic evidence of prior exposure or vaccination with immunity. [address] screening has recently been performed and does not suggest [address] Repeat imaging is due in 02/2021. Ultrasound was ordered today. Will perform fibroscan in the office annually to document hepatic fibrosis. Fibroscan can assess liver fibrosis and determine if a patient has advanced fibrosis or cirrhosis without the need for liver biopsy. 25 minutes total time spent with this patient with more than 50% of this time spent counseling and coordinating care as described above. [address] in 3 months for a virtual visit.      Aime Ricardo NP   L[facility] [place] of [address] [place] [address], [address] [place] [address]  [phone]

## 2020-11-10 NOTE — PROGRESS NOTES
1. Have you been to the ER, urgent care clinic since your last visit? Hospitalized since your last visit? No    2. Have you seen or consulted any other health care providers outside of the [address] [place] [address] since your last visit? Include any pap smears or colon screening.  Yes Oncologist

## 2020-12-06 LAB
AFP L3 MFR SERPL: 9.9 % (ref 0–9.9)
AFP SERPL-MCNC: 4.7 NG/ML (ref 0–8)
ALBUMIN SERPL-MCNC: 2.8 G/DL (ref 3.7–4.7)
ALP SERPL-CCNC: 132 IU/L (ref 39–117)
ALT SERPL-CCNC: 33 IU/L (ref 0–32)
AST SERPL-CCNC: 38 IU/L (ref 0–40)
BASOPHILS # BLD AUTO: 0 X10E3/UL (ref 0–0.2)
BASOPHILS NFR BLD AUTO: 1 %
BILIRUB DIRECT SERPL-MCNC: 0.81 MG/DL (ref 0–0.4)
BILIRUB SERPL-MCNC: 2.3 MG/DL (ref 0–1.2)
BUN SERPL-MCNC: 29 MG/DL (ref 8–27)
BUN/CREAT SERPL: 14 (ref 12–28)
CALCIUM SERPL-MCNC: 10 MG/DL (ref 8.7–10.3)
CHLORIDE SERPL-SCNC: 103 MMOL/L (ref 96–106)
CO2 SERPL-SCNC: 23 MMOL/L (ref 20–29)
CREAT SERPL-MCNC: 2.07 MG/DL (ref 0.57–1)
EOSINOPHIL # BLD AUTO: 0.2 X10E3/UL (ref 0–0.4)
EOSINOPHIL NFR BLD AUTO: 3 %
ERYTHROCYTE [DISTWIDTH] IN BLOOD BY AUTOMATED COUNT: 12.7 % (ref 11.7–15.4)
GLUCOSE SERPL-MCNC: 188 MG/DL (ref 65–99)
HCT VFR BLD AUTO: 32.2 % (ref 34–46.6)
HGB BLD-MCNC: 11.2 G/DL (ref 11.1–15.9)
IMM GRANULOCYTES # BLD AUTO: 0 X10E3/UL (ref 0–0.1)
IMM GRANULOCYTES NFR BLD AUTO: 0 %
INR PPP: 1.2 (ref 0.9–1.2)
LYMPHOCYTES # BLD AUTO: 1.2 X10E3/UL (ref 0.7–3.1)
LYMPHOCYTES NFR BLD AUTO: 25 %
MCH RBC QN AUTO: 36.4 PG (ref 26.6–33)
MCHC RBC AUTO-ENTMCNC: 34.8 G/DL (ref 31.5–35.7)
MCV RBC AUTO: 105 FL (ref 79–97)
MONOCYTES # BLD AUTO: 0.4 X10E3/UL (ref 0.1–0.9)
MONOCYTES NFR BLD AUTO: 9 %
NEUTROPHILS # BLD AUTO: 3.1 X10E3/UL (ref 1.4–7)
NEUTROPHILS NFR BLD AUTO: 62 %
PLATELET # BLD AUTO: 100 X10E3/UL (ref 150–450)
POTASSIUM SERPL-SCNC: 5.1 MMOL/L (ref 3.5–5.2)
PROT SERPL-MCNC: 8.1 G/DL (ref 6–8.5)
PROTHROMBIN TIME: 13 SEC (ref 9.1–12)
RBC # BLD AUTO: 3.08 X10E6/UL (ref 3.77–5.28)
SODIUM SERPL-SCNC: 139 MMOL/L (ref 134–144)
WBC # BLD AUTO: 4.9 X10E3/UL (ref 3.4–10.8)

## 2021-01-01 DIAGNOSIS — K74.69 CIRRHOSIS, CRYPTOGENIC (HCC): ICD-10-CM

## 2021-02-10 ENCOUNTER — VIRTUAL VISIT (OUTPATIENT)
Dept: HEMATOLOGY | Age: 71
End: 2021-02-10
Payer: MEDICARE

## 2021-02-10 DIAGNOSIS — K74.69 CIRRHOSIS, CRYPTOGENIC (HCC): Primary | ICD-10-CM

## 2021-02-10 PROCEDURE — 99442 PR PHYS/QHP TELEPHONE EVALUATION 11-20 MIN: CPT | Performed by: NURSE PRACTITIONER

## 2021-02-10 RX ORDER — DORZOLAMIDE HYDROCHLORIDE AND TIMOLOL MALEATE 20; 5 MG/ML; MG/ML
1 SOLUTION/ DROPS OPHTHALMIC 2 TIMES DAILY
COMMUNITY
Start: 2021-01-12 | End: 2022-01-12

## 2021-02-10 NOTE — PROGRESS NOTES
Branden Loya is a 65 y.o. female, evaluated via audio-only technology on 2/10/2021 for cryptogenic cirrhosis. Since the last office appointment:  Kwaku Mosley seen by nephrology, Dr. Daniella Lei. Reduced the diuretics to 50 mg Aldactone daily and 40 mg lasix daily. Stopped losartan. Had the first of two COVID vaccinations. Reports that her Hgb A1C is down to 5.2. Assessment & Plan:   Cryptogenic cirrhosis. This is likely secondary to BOURNE given DM and the finding of minimal steatosis on the liver biopsy. However, she does have positive autoimmune markers and manifests some autoimmune disorders, so this could be of resolving autoimmune etiology. The most recent laboratory studies indicate that the liver transaminases are normal, alkaline phosphatase is elevated, tests of hepatic synthetic and metabolic function are normal, and the platelet count is depressed.        Complications of cirrhosis were discussed in detail. We discussed thrombocytopenia, portal hypertension, varices, GI bleeding, peripheral edema, ascites, hepatic encephalopathy, and hepatocellular carcinoma. We discussed the need for follow ups on a regular basis to monitor for complications. We discussed the need for every 6 month liver imaging studies.      MELD is 21, CPT is 7, Child's Savage Class B    All medications and labs were reviewed with the patient. Care team updated. Ultrasound of the liver was ordered. The order will be mailed to the patient. Follow up in 3 months. 12  Subjective: The patient has no complaints which can be attributed to liver disease. The patient completes all daily activities without any functional limitations.  The patient has not experienced fatigue, fevers, chills, shortness of breath, chest pain, pain in the right side over the liver, diffuse abdominal pain, nausea, vomiting, constipation, diarrhrea, dry eyes, dry mouth, arthralgias, myalgias, yellowing of the eyes or skin, itching, dark urine, problems concentrating, swelling of the abdomen, swelling of the lower extremities, hematemesis, or hematochezia. Prior to Admission medications    Medication Sig Start Date End Date Taking? Authorizing Provider   dorzolamide-timoloL (COSOPT) 22.3-6.8 mg/mL ophthalmic solution 1 Drop two (2) times a day. 1/12/21 1/12/22 Yes Provider, Historical   orphenadrine citrate (NORFLEX) 100 mg sr tablet Take 1 Tab by mouth two (2) times a day. 11/10/20   Angel Prude, NP   furosemide (LASIX) 80 mg tablet Take 1 Tab by mouth daily. 11/10/20   Angel Prude, NP   spironolactone (ALDACTONE) 100 mg tablet Take 2 Tabs by mouth daily. 11/10/20   Angel Prude, NP   brimonidine (ALPHAGAN P) 0.1 % ophthalmic solution 1 Drop two (2) times a day. Provider, Historical   insulin regular (NOVOLIN R, HUMULIN R) 100 unit/mL injection Inject up to 20 Units SQ BID. May be combined with NPH insulin in 1 syringe. Always draw this insulin into syringe first before the NPH. 12/30/19   Provider, Historical   therapeutic multivitamin (THERAGRAN) tablet Take 1 Tab by mouth daily. Provider, Historical   denosumab (PROLIA) 60 mg/mL injection 60 mg by SubCUTAneous route. Provider, Historical   losartan (COZAAR) 25 mg tablet Take  by mouth daily. Take a half tablet once a day. 2/10/21  Provider, Historical   insulin NPH (NOVOLIN N, HUMULIN N) 100 unit/mL injection 35 Units by SubCUTAneous route two (2) times a day. Provider, Historical   levothyroxine (SYNTHROID) 100 mcg tablet Take 100 mcg by mouth Daily (before breakfast). Indications: HYPOTHYROIDISM    Provider, Historical       ROS   Denies c/o      No flowsheet data found. Anya Alberta, who was evaluated through a patient-initiated, synchronous (real-time) audio only encounter, and/or her healthcare decision maker, is aware that it is a billable service, with coverage as determined by her insurance carrier.  She provided verbal consent to proceed: Yes. She has not had a related appointment within my department in the past 7 days or scheduled within the next 24 hours.      Total Time: minutes: 11-20 minutes    Ricco Armas NP

## 2021-02-26 RX ORDER — SPIRONOLACTONE 50 MG/1
TABLET, FILM COATED ORAL
Qty: 90 TAB | Refills: 0 | Status: SHIPPED | OUTPATIENT
Start: 2021-02-26

## 2021-03-03 RX ORDER — ORPHENADRINE CITRATE 100 MG/1
100 TABLET, EXTENDED RELEASE ORAL 2 TIMES DAILY
Qty: 180 TAB | Refills: 1 | Status: SHIPPED | OUTPATIENT
Start: 2021-03-03

## (undated) DEVICE — KENDALL RADIOLUCENT FOAM MONITORING ELECTRODE RECTANGULAR SHAPE: Brand: KENDALL

## (undated) DEVICE — MOUTHPIECE ENDOSCP 20X27MM --

## (undated) DEVICE — TRAP SPEC COLL POLYP POLYSTYR --

## (undated) DEVICE — SINGLE PORT MANIFOLD: Brand: NEPTUNE 2

## (undated) DEVICE — MAJ-1414 SINGLE USE ADPATER BIOPSY VALV: Brand: SINGLE USE ADAPTOR BIOPSY VALVE

## (undated) DEVICE — ENDO CARRY-ON PROCEDURE KIT INCLUDES ENZYMATIC SPONGE, GAUZE, BIOHAZARD LABEL, TRAY, LUBRICANT, DIRTY SCOPE LABEL, WATER LABEL, TRAY, DRAWSTRING PAD, AND DEFENDO 4-PIECE KIT.: Brand: ENDO CARRY-ON PROCEDURE KIT

## (undated) DEVICE — MEDI-VAC NON-CONDUCTIVE SUCTION TUBING: Brand: CARDINAL HEALTH

## (undated) DEVICE — SPONGE GZ W4XL4IN COT 12 PLY TYP VII WVN C FLD DSGN

## (undated) DEVICE — LIGATOR ENDOSCP DIA8.6-11.5MM MULT DISP SPDBND LIGATOR SUP